# Patient Record
Sex: FEMALE | Race: WHITE | NOT HISPANIC OR LATINO | Employment: OTHER | URBAN - METROPOLITAN AREA
[De-identification: names, ages, dates, MRNs, and addresses within clinical notes are randomized per-mention and may not be internally consistent; named-entity substitution may affect disease eponyms.]

---

## 2023-06-08 LAB
ALANINE AMINOTRANSFERASE (SGPT) (U/L) IN SER/PLAS: 12 U/L (ref 7–45)
ALBUMIN (G/DL) IN SER/PLAS: 4.1 G/DL (ref 3.4–5)
ALKALINE PHOSPHATASE (U/L) IN SER/PLAS: 54 U/L (ref 33–136)
ANION GAP IN SER/PLAS: 12 MMOL/L (ref 10–20)
ASPARTATE AMINOTRANSFERASE (SGOT) (U/L) IN SER/PLAS: 17 U/L (ref 9–39)
BILIRUBIN TOTAL (MG/DL) IN SER/PLAS: 0.4 MG/DL (ref 0–1.2)
CALCIDIOL (25 OH VITAMIN D3) (NG/ML) IN SER/PLAS: 56 NG/ML
CALCIUM (MG/DL) IN SER/PLAS: 9.6 MG/DL (ref 8.6–10.3)
CARBON DIOXIDE, TOTAL (MMOL/L) IN SER/PLAS: 29 MMOL/L (ref 21–32)
CHLORIDE (MMOL/L) IN SER/PLAS: 103 MMOL/L (ref 98–107)
CHOLESTEROL (MG/DL) IN SER/PLAS: 143 MG/DL (ref 0–199)
CHOLESTEROL IN HDL (MG/DL) IN SER/PLAS: 51.9 MG/DL
CHOLESTEROL/HDL RATIO: 2.8
CREATININE (MG/DL) IN SER/PLAS: 1.39 MG/DL (ref 0.5–1.05)
ERYTHROCYTE DISTRIBUTION WIDTH (RATIO) BY AUTOMATED COUNT: 13.1 % (ref 11.5–14.5)
ERYTHROCYTE MEAN CORPUSCULAR HEMOGLOBIN CONCENTRATION (G/DL) BY AUTOMATED: 31.4 G/DL (ref 32–36)
ERYTHROCYTE MEAN CORPUSCULAR VOLUME (FL) BY AUTOMATED COUNT: 95 FL (ref 80–100)
ERYTHROCYTES (10*6/UL) IN BLOOD BY AUTOMATED COUNT: 4.19 X10E12/L (ref 4–5.2)
ESTIMATED AVERAGE GLUCOSE FOR HBA1C: 134 MG/DL
GFR FEMALE: 36 ML/MIN/1.73M2
GLUCOSE (MG/DL) IN SER/PLAS: 109 MG/DL (ref 74–99)
HEMATOCRIT (%) IN BLOOD BY AUTOMATED COUNT: 39.8 % (ref 36–46)
HEMOGLOBIN (G/DL) IN BLOOD: 12.5 G/DL (ref 12–16)
HEMOGLOBIN A1C/HEMOGLOBIN TOTAL IN BLOOD: 6.3 %
LDL: 76 MG/DL (ref 0–99)
LEUKOCYTES (10*3/UL) IN BLOOD BY AUTOMATED COUNT: 5.3 X10E9/L (ref 4.4–11.3)
PARATHYRIN INTACT (PG/ML) IN SER/PLAS: 44 PG/ML (ref 18.5–88)
PHOSPHATE (MG/DL) IN SER/PLAS: 3.8 MG/DL (ref 2.5–4.9)
PLATELETS (10*3/UL) IN BLOOD AUTOMATED COUNT: 211 X10E9/L (ref 150–450)
POTASSIUM (MMOL/L) IN SER/PLAS: 4.7 MMOL/L (ref 3.5–5.3)
PROTEIN TOTAL: 7.1 G/DL (ref 6.4–8.2)
SODIUM (MMOL/L) IN SER/PLAS: 139 MMOL/L (ref 136–145)
THYROTROPIN (MIU/L) IN SER/PLAS BY DETECTION LIMIT <= 0.05 MIU/L: 2.44 MIU/L (ref 0.44–3.98)
TRIGLYCERIDE (MG/DL) IN SER/PLAS: 77 MG/DL (ref 0–149)
UREA NITROGEN (MG/DL) IN SER/PLAS: 25 MG/DL (ref 6–23)
VLDL: 15 MG/DL (ref 0–40)

## 2023-06-10 LAB — URINE CULTURE: NORMAL

## 2023-09-22 ENCOUNTER — HOSPITAL ENCOUNTER (INPATIENT)
Dept: DATA CONVERSION | Facility: HOSPITAL | Age: 88
LOS: 5 days | Discharge: HOME | End: 2023-09-27
Attending: INTERNAL MEDICINE | Admitting: INTERNAL MEDICINE
Payer: MEDICARE

## 2023-09-22 DIAGNOSIS — K56.3 GALLSTONE ILEUS (MULTI): ICD-10-CM

## 2023-09-22 LAB
ABO GROUP (TYPE) IN BLOOD: NORMAL
ALANINE AMINOTRANSFERASE (SGPT) (U/L) IN SER/PLAS: 77 U/L (ref 7–45)
ALBUMIN (G/DL) IN SER/PLAS: 3.6 G/DL (ref 3.4–5)
ALBUMIN (G/DL) IN SER/PLAS: 3.9 G/DL (ref 3.4–5)
ALKALINE PHOSPHATASE (U/L) IN SER/PLAS: 77 U/L (ref 33–136)
ANION GAP IN SER/PLAS: 11 MMOL/L (ref 10–20)
ANION GAP IN SER/PLAS: 12 MMOL/L (ref 10–20)
ANTIBODY SCREEN: NORMAL
APPEARANCE, URINE: CLEAR
APPEARANCE, URINE: CLEAR
ASPARTATE AMINOTRANSFERASE (SGOT) (U/L) IN SER/PLAS: 160 U/L (ref 9–39)
BASOPHILS (10*3/UL) IN BLOOD BY AUTOMATED COUNT: 0.03 X10E9/L (ref 0–0.1)
BASOPHILS/100 LEUKOCYTES IN BLOOD BY AUTOMATED COUNT: 0.2 % (ref 0–2)
BILIRUBIN TOTAL (MG/DL) IN SER/PLAS: 0.7 MG/DL (ref 0–1.2)
BILIRUBIN, URINE: NEGATIVE
BILIRUBIN, URINE: NEGATIVE
BLOOD, URINE: NEGATIVE
BLOOD, URINE: NEGATIVE
CALCIUM (MG/DL) IN SER/PLAS: 8.3 MG/DL (ref 8.6–10.3)
CALCIUM (MG/DL) IN SER/PLAS: 9.1 MG/DL (ref 8.6–10.3)
CARBON DIOXIDE, TOTAL (MMOL/L) IN SER/PLAS: 25 MMOL/L (ref 21–32)
CARBON DIOXIDE, TOTAL (MMOL/L) IN SER/PLAS: 27 MMOL/L (ref 21–32)
CHLORIDE (MMOL/L) IN SER/PLAS: 101 MMOL/L (ref 98–107)
CHLORIDE (MMOL/L) IN SER/PLAS: 104 MMOL/L (ref 98–107)
COLOR, URINE: YELLOW
COLOR, URINE: YELLOW
CREATININE (MG/DL) IN SER/PLAS: 1.2 MG/DL (ref 0.5–1.05)
CREATININE (MG/DL) IN SER/PLAS: 1.26 MG/DL (ref 0.5–1.05)
EOSINOPHILS (10*3/UL) IN BLOOD BY AUTOMATED COUNT: 0.17 X10E9/L (ref 0–0.4)
EOSINOPHILS/100 LEUKOCYTES IN BLOOD BY AUTOMATED COUNT: 1.4 % (ref 0–6)
ERYTHROCYTE DISTRIBUTION WIDTH (RATIO) BY AUTOMATED COUNT: 12.2 % (ref 11.5–14.5)
ERYTHROCYTE DISTRIBUTION WIDTH (RATIO) BY AUTOMATED COUNT: 12.3 % (ref 11.5–14.5)
ERYTHROCYTE MEAN CORPUSCULAR HEMOGLOBIN CONCENTRATION (G/DL) BY AUTOMATED: 32.5 G/DL (ref 32–36)
ERYTHROCYTE MEAN CORPUSCULAR HEMOGLOBIN CONCENTRATION (G/DL) BY AUTOMATED: 33 G/DL (ref 32–36)
ERYTHROCYTE MEAN CORPUSCULAR VOLUME (FL) BY AUTOMATED COUNT: 93 FL (ref 80–100)
ERYTHROCYTE MEAN CORPUSCULAR VOLUME (FL) BY AUTOMATED COUNT: 93 FL (ref 80–100)
ERYTHROCYTES (10*6/UL) IN BLOOD BY AUTOMATED COUNT: 4.12 X10E12/L (ref 4–5.2)
ERYTHROCYTES (10*6/UL) IN BLOOD BY AUTOMATED COUNT: 4.16 X10E12/L (ref 4–5.2)
GFR FEMALE: 40 ML/MIN/1.73M2
GFR FEMALE: 43 ML/MIN/1.73M2
GLUCOSE (MG/DL) IN SER/PLAS: 169 MG/DL (ref 74–99)
GLUCOSE (MG/DL) IN SER/PLAS: 200 MG/DL (ref 74–99)
GLUCOSE, URINE: NEGATIVE MG/DL
GLUCOSE, URINE: NEGATIVE MG/DL
HEMATOCRIT (%) IN BLOOD BY AUTOMATED COUNT: 38.5 % (ref 36–46)
HEMATOCRIT (%) IN BLOOD BY AUTOMATED COUNT: 38.5 % (ref 36–46)
HEMOGLOBIN (G/DL) IN BLOOD: 12.5 G/DL (ref 12–16)
HEMOGLOBIN (G/DL) IN BLOOD: 12.7 G/DL (ref 12–16)
IMMATURE GRANULOCYTES/100 LEUKOCYTES IN BLOOD BY AUTOMATED COUNT: 0.5 % (ref 0–0.9)
INR IN PPP BY COAGULATION ASSAY: 1 (ref 0.9–1.1)
KETONES, URINE: NEGATIVE MG/DL
KETONES, URINE: NEGATIVE MG/DL
LACTATE (MMOL/L) IN SER/PLAS: 1.2 MMOL/L (ref 0.4–2)
LEUKOCYTE ESTERASE, URINE: ABNORMAL
LEUKOCYTE ESTERASE, URINE: NEGATIVE
LEUKOCYTES (10*3/UL) IN BLOOD BY AUTOMATED COUNT: 10.8 X10E9/L (ref 4.4–11.3)
LEUKOCYTES (10*3/UL) IN BLOOD BY AUTOMATED COUNT: 12.1 X10E9/L (ref 4.4–11.3)
LIPASE (U/L) IN SER/PLAS: 67 U/L (ref 9–82)
LYMPHOCYTES (10*3/UL) IN BLOOD BY AUTOMATED COUNT: 0.79 X10E9/L (ref 0.8–3)
LYMPHOCYTES/100 LEUKOCYTES IN BLOOD BY AUTOMATED COUNT: 6.5 % (ref 13–44)
MONOCYTES (10*3/UL) IN BLOOD BY AUTOMATED COUNT: 0.81 X10E9/L (ref 0.05–0.8)
MONOCYTES/100 LEUKOCYTES IN BLOOD BY AUTOMATED COUNT: 6.7 % (ref 2–10)
NEUTROPHILS (10*3/UL) IN BLOOD BY AUTOMATED COUNT: 10.26 X10E9/L (ref 1.6–5.5)
NEUTROPHILS/100 LEUKOCYTES IN BLOOD BY AUTOMATED COUNT: 84.7 % (ref 40–80)
NITRITE, URINE: NEGATIVE
NITRITE, URINE: NEGATIVE
NRBC (PER 100 WBCS) BY AUTOMATED COUNT: 0 /100 WBC (ref 0–0)
NRBC (PER 100 WBCS) BY AUTOMATED COUNT: 0 /100 WBC (ref 0–0)
PH, URINE: 6 (ref 5–8)
PH, URINE: 7 (ref 5–8)
PHOSPHATE (MG/DL) IN SER/PLAS: 2.5 MG/DL (ref 2.5–4.9)
PLATELETS (10*3/UL) IN BLOOD AUTOMATED COUNT: 232 X10E9/L (ref 150–450)
PLATELETS (10*3/UL) IN BLOOD AUTOMATED COUNT: 256 X10E9/L (ref 150–450)
POTASSIUM (MMOL/L) IN SER/PLAS: 3.9 MMOL/L (ref 3.5–5.3)
POTASSIUM (MMOL/L) IN SER/PLAS: 4.3 MMOL/L (ref 3.5–5.3)
PROTEIN TOTAL: 6.5 G/DL (ref 6.4–8.2)
PROTEIN, URINE: ABNORMAL MG/DL
PROTEIN, URINE: NEGATIVE MG/DL
PROTHROMBIN TIME (PT) IN PPP BY COAGULATION ASSAY: 11.4 SEC (ref 9.8–12.8)
RBC, URINE: ABNORMAL /HPF (ref 0–5)
RBC, URINE: ABNORMAL /HPF (ref 0–5)
RH FACTOR: NORMAL
SARS-COV-2 RESULT: NORMAL
SODIUM (MMOL/L) IN SER/PLAS: 135 MMOL/L (ref 136–145)
SODIUM (MMOL/L) IN SER/PLAS: 137 MMOL/L (ref 136–145)
SPECIFIC GRAVITY, URINE: 1.02 (ref 1–1.03)
SPECIFIC GRAVITY, URINE: 1.02 (ref 1–1.03)
SQUAMOUS EPITHELIAL CELLS, URINE: 1 /HPF
UREA NITROGEN (MG/DL) IN SER/PLAS: 27 MG/DL (ref 6–23)
UREA NITROGEN (MG/DL) IN SER/PLAS: 29 MG/DL (ref 6–23)
UROBILINOGEN, URINE: 2 MG/DL (ref 0–1.9)
UROBILINOGEN, URINE: 4 MG/DL (ref 0–1.9)
WBC, URINE: ABNORMAL /HPF (ref 0–5)
WBC, URINE: ABNORMAL /HPF (ref 0–5)

## 2023-09-23 LAB
ALANINE AMINOTRANSFERASE (SGPT) (U/L) IN SER/PLAS: 190 U/L (ref 7–45)
ALBUMIN (G/DL) IN SER/PLAS: 3 G/DL (ref 3.4–5)
ALKALINE PHOSPHATASE (U/L) IN SER/PLAS: 51 U/L (ref 33–136)
ANION GAP IN SER/PLAS: 11 MMOL/L (ref 10–20)
ANION GAP IN SER/PLAS: NORMAL
ASPARTATE AMINOTRANSFERASE (SGOT) (U/L) IN SER/PLAS: 106 U/L (ref 9–39)
BILIRUBIN TOTAL (MG/DL) IN SER/PLAS: 0.9 MG/DL (ref 0–1.2)
CALCIUM (MG/DL) IN SER/PLAS: 7.7 MG/DL (ref 8.6–10.3)
CALCIUM (MG/DL) IN SER/PLAS: NORMAL
CARBON DIOXIDE, TOTAL (MMOL/L) IN SER/PLAS: 25 MMOL/L (ref 21–32)
CARBON DIOXIDE, TOTAL (MMOL/L) IN SER/PLAS: NORMAL
CHLORIDE (MMOL/L) IN SER/PLAS: 104 MMOL/L (ref 98–107)
CHLORIDE (MMOL/L) IN SER/PLAS: NORMAL
CREATININE (MG/DL) IN SER/PLAS: 1.12 MG/DL (ref 0.5–1.05)
CREATININE (MG/DL) IN SER/PLAS: NORMAL
ERYTHROCYTE DISTRIBUTION WIDTH (RATIO) BY AUTOMATED COUNT: 12.5 % (ref 11.5–14.5)
ERYTHROCYTE DISTRIBUTION WIDTH (RATIO) BY AUTOMATED COUNT: NORMAL
ERYTHROCYTE MEAN CORPUSCULAR HEMOGLOBIN CONCENTRATION (G/DL) BY AUTOMATED: 32.7 G/DL (ref 32–36)
ERYTHROCYTE MEAN CORPUSCULAR HEMOGLOBIN CONCENTRATION (G/DL) BY AUTOMATED: NORMAL
ERYTHROCYTE MEAN CORPUSCULAR VOLUME (FL) BY AUTOMATED COUNT: 94 FL (ref 80–100)
ERYTHROCYTE MEAN CORPUSCULAR VOLUME (FL) BY AUTOMATED COUNT: NORMAL
ERYTHROCYTES (10*6/UL) IN BLOOD BY AUTOMATED COUNT: 3.72 X10E12/L (ref 4–5.2)
ERYTHROCYTES (10*6/UL) IN BLOOD BY AUTOMATED COUNT: NORMAL
GFR FEMALE: 47 ML/MIN/1.73M2
GFR FEMALE: NORMAL
GFR MALE: NORMAL
GLUCOSE (MG/DL) IN SER/PLAS: 145 MG/DL (ref 74–99)
GLUCOSE (MG/DL) IN SER/PLAS: NORMAL
HEMATOCRIT (%) IN BLOOD BY AUTOMATED COUNT: 34.9 % (ref 36–46)
HEMATOCRIT (%) IN BLOOD BY AUTOMATED COUNT: NORMAL
HEMOGLOBIN (G/DL) IN BLOOD: 11.4 G/DL (ref 12–16)
HEMOGLOBIN (G/DL) IN BLOOD: NORMAL
LEUKOCYTES (10*3/UL) IN BLOOD BY AUTOMATED COUNT: 14.2 X10E9/L (ref 4.4–11.3)
LEUKOCYTES (10*3/UL) IN BLOOD BY AUTOMATED COUNT: NORMAL
MAGNESIUM (MG/DL) IN SER/PLAS: 1.92 MG/DL (ref 1.6–2.4)
NRBC (PER 100 WBCS) BY AUTOMATED COUNT: 0 /100 WBC (ref 0–0)
NRBC (PER 100 WBCS) BY AUTOMATED COUNT: NORMAL
PLATELETS (10*3/UL) IN BLOOD AUTOMATED COUNT: 199 X10E9/L (ref 150–450)
PLATELETS (10*3/UL) IN BLOOD AUTOMATED COUNT: NORMAL
POTASSIUM (MMOL/L) IN SER/PLAS: 4.2 MMOL/L (ref 3.5–5.3)
POTASSIUM (MMOL/L) IN SER/PLAS: NORMAL
PROTEIN TOTAL: 5.2 G/DL (ref 6.4–8.2)
SODIUM (MMOL/L) IN SER/PLAS: 136 MMOL/L (ref 136–145)
SODIUM (MMOL/L) IN SER/PLAS: NORMAL
UREA NITROGEN (MG/DL) IN SER/PLAS: 21 MG/DL (ref 6–23)
UREA NITROGEN (MG/DL) IN SER/PLAS: NORMAL
URINE CULTURE: NORMAL

## 2023-09-24 LAB
ALBUMIN (G/DL) IN SER/PLAS: 3 G/DL (ref 3.4–5)
ANION GAP IN SER/PLAS: 13 MMOL/L (ref 10–20)
CALCIUM (MG/DL) IN SER/PLAS: 7.9 MG/DL (ref 8.6–10.3)
CARBON DIOXIDE, TOTAL (MMOL/L) IN SER/PLAS: 24 MMOL/L (ref 21–32)
CHLORIDE (MMOL/L) IN SER/PLAS: 105 MMOL/L (ref 98–107)
CREATININE (MG/DL) IN SER/PLAS: 1.08 MG/DL (ref 0.5–1.05)
ERYTHROCYTE DISTRIBUTION WIDTH (RATIO) BY AUTOMATED COUNT: 12.5 % (ref 11.5–14.5)
ERYTHROCYTE MEAN CORPUSCULAR HEMOGLOBIN CONCENTRATION (G/DL) BY AUTOMATED: 32 G/DL (ref 32–36)
ERYTHROCYTE MEAN CORPUSCULAR VOLUME (FL) BY AUTOMATED COUNT: 96 FL (ref 80–100)
ERYTHROCYTES (10*6/UL) IN BLOOD BY AUTOMATED COUNT: 3.51 X10E12/L (ref 4–5.2)
GFR FEMALE: 49 ML/MIN/1.73M2
GLUCOSE (MG/DL) IN SER/PLAS: 70 MG/DL (ref 74–99)
HEMATOCRIT (%) IN BLOOD BY AUTOMATED COUNT: 33.8 % (ref 36–46)
HEMOGLOBIN (G/DL) IN BLOOD: 10.8 G/DL (ref 12–16)
LEUKOCYTES (10*3/UL) IN BLOOD BY AUTOMATED COUNT: 9.1 X10E9/L (ref 4.4–11.3)
MAGNESIUM (MG/DL) IN SER/PLAS: 2 MG/DL (ref 1.6–2.4)
NRBC (PER 100 WBCS) BY AUTOMATED COUNT: 0 /100 WBC (ref 0–0)
PHOSPHATE (MG/DL) IN SER/PLAS: 2.5 MG/DL (ref 2.5–4.9)
PLATELETS (10*3/UL) IN BLOOD AUTOMATED COUNT: 176 X10E9/L (ref 150–450)
POTASSIUM (MMOL/L) IN SER/PLAS: 3.9 MMOL/L (ref 3.5–5.3)
SODIUM (MMOL/L) IN SER/PLAS: 138 MMOL/L (ref 136–145)
UREA NITROGEN (MG/DL) IN SER/PLAS: 18 MG/DL (ref 6–23)

## 2023-09-25 LAB
ALBUMIN (G/DL) IN SER/PLAS: 3.4 G/DL (ref 3.4–5)
ANION GAP IN SER/PLAS: 12 MMOL/L (ref 10–20)
CALCIUM (MG/DL) IN SER/PLAS: 8.6 MG/DL (ref 8.6–10.3)
CARBON DIOXIDE, TOTAL (MMOL/L) IN SER/PLAS: 28 MMOL/L (ref 21–32)
CHLORIDE (MMOL/L) IN SER/PLAS: 104 MMOL/L (ref 98–107)
COMPLETE PATHOLOGY REPORT: NORMAL
CONVERTED CLINICAL DIAGNOSIS-HISTORY: NORMAL
CONVERTED FINAL DIAGNOSIS: NORMAL
CONVERTED FINAL REPORT PDF LINK TO COPY AND PASTE: NORMAL
CONVERTED GROSS DESCRIPTION: NORMAL
CREATININE (MG/DL) IN SER/PLAS: 1.15 MG/DL (ref 0.5–1.05)
ERYTHROCYTE DISTRIBUTION WIDTH (RATIO) BY AUTOMATED COUNT: 12.4 % (ref 11.5–14.5)
ERYTHROCYTE MEAN CORPUSCULAR HEMOGLOBIN CONCENTRATION (G/DL) BY AUTOMATED: 32.3 G/DL (ref 32–36)
ERYTHROCYTE MEAN CORPUSCULAR VOLUME (FL) BY AUTOMATED COUNT: 95 FL (ref 80–100)
ERYTHROCYTES (10*6/UL) IN BLOOD BY AUTOMATED COUNT: 3.83 X10E12/L (ref 4–5.2)
GFR FEMALE: 45 ML/MIN/1.73M2
GLUCOSE (MG/DL) IN SER/PLAS: 87 MG/DL (ref 74–99)
HEMATOCRIT (%) IN BLOOD BY AUTOMATED COUNT: 36.2 % (ref 36–46)
HEMOGLOBIN (G/DL) IN BLOOD: 11.7 G/DL (ref 12–16)
LEUKOCYTES (10*3/UL) IN BLOOD BY AUTOMATED COUNT: 7 X10E9/L (ref 4.4–11.3)
MAGNESIUM (MG/DL) IN SER/PLAS: 2.05 MG/DL (ref 1.6–2.4)
NRBC (PER 100 WBCS) BY AUTOMATED COUNT: 0 /100 WBC (ref 0–0)
PHOSPHATE (MG/DL) IN SER/PLAS: 2.9 MG/DL (ref 2.5–4.9)
PLATELETS (10*3/UL) IN BLOOD AUTOMATED COUNT: 194 X10E9/L (ref 150–450)
POTASSIUM (MMOL/L) IN SER/PLAS: 3.7 MMOL/L (ref 3.5–5.3)
SODIUM (MMOL/L) IN SER/PLAS: 140 MMOL/L (ref 136–145)
UREA NITROGEN (MG/DL) IN SER/PLAS: 15 MG/DL (ref 6–23)

## 2023-09-26 LAB
ALBUMIN (G/DL) IN SER/PLAS: 3.4 G/DL (ref 3.4–5)
ANION GAP IN SER/PLAS: 13 MMOL/L (ref 10–20)
BLOOD CULTURE: NORMAL
BLOOD CULTURE: NORMAL
CALCIUM (MG/DL) IN SER/PLAS: 8.7 MG/DL (ref 8.6–10.3)
CARBON DIOXIDE, TOTAL (MMOL/L) IN SER/PLAS: 26 MMOL/L (ref 21–32)
CHLORIDE (MMOL/L) IN SER/PLAS: 104 MMOL/L (ref 98–107)
CREATININE (MG/DL) IN SER/PLAS: 1.11 MG/DL (ref 0.5–1.05)
ERYTHROCYTE DISTRIBUTION WIDTH (RATIO) BY AUTOMATED COUNT: 12.5 % (ref 11.5–14.5)
ERYTHROCYTE MEAN CORPUSCULAR HEMOGLOBIN CONCENTRATION (G/DL) BY AUTOMATED: 32.7 G/DL (ref 32–36)
ERYTHROCYTE MEAN CORPUSCULAR VOLUME (FL) BY AUTOMATED COUNT: 94 FL (ref 80–100)
ERYTHROCYTES (10*6/UL) IN BLOOD BY AUTOMATED COUNT: 3.78 X10E12/L (ref 4–5.2)
GFR FEMALE: 47 ML/MIN/1.73M2
GLUCOSE (MG/DL) IN SER/PLAS: 95 MG/DL (ref 74–99)
HEMATOCRIT (%) IN BLOOD BY AUTOMATED COUNT: 35.5 % (ref 36–46)
HEMOGLOBIN (G/DL) IN BLOOD: 11.6 G/DL (ref 12–16)
LEUKOCYTES (10*3/UL) IN BLOOD BY AUTOMATED COUNT: 6.3 X10E9/L (ref 4.4–11.3)
MAGNESIUM (MG/DL) IN SER/PLAS: 1.89 MG/DL (ref 1.6–2.4)
NRBC (PER 100 WBCS) BY AUTOMATED COUNT: 0 /100 WBC (ref 0–0)
PHOSPHATE (MG/DL) IN SER/PLAS: 3.2 MG/DL (ref 2.5–4.9)
PLATELETS (10*3/UL) IN BLOOD AUTOMATED COUNT: 213 X10E9/L (ref 150–450)
POTASSIUM (MMOL/L) IN SER/PLAS: 3.7 MMOL/L (ref 3.5–5.3)
SODIUM (MMOL/L) IN SER/PLAS: 139 MMOL/L (ref 136–145)
UREA NITROGEN (MG/DL) IN SER/PLAS: 14 MG/DL (ref 6–23)

## 2023-09-27 LAB
ALBUMIN (G/DL) IN SER/PLAS: 3.5 G/DL (ref 3.4–5)
ANION GAP IN SER/PLAS: 13 MMOL/L (ref 10–20)
CALCIUM (MG/DL) IN SER/PLAS: 9 MG/DL (ref 8.6–10.3)
CARBON DIOXIDE, TOTAL (MMOL/L) IN SER/PLAS: 26 MMOL/L (ref 21–32)
CHLORIDE (MMOL/L) IN SER/PLAS: 104 MMOL/L (ref 98–107)
CREATININE (MG/DL) IN SER/PLAS: 1.07 MG/DL (ref 0.5–1.05)
ERYTHROCYTE DISTRIBUTION WIDTH (RATIO) BY AUTOMATED COUNT: 12.5 % (ref 11.5–14.5)
ERYTHROCYTE MEAN CORPUSCULAR HEMOGLOBIN CONCENTRATION (G/DL) BY AUTOMATED: 31.9 G/DL (ref 32–36)
ERYTHROCYTE MEAN CORPUSCULAR VOLUME (FL) BY AUTOMATED COUNT: 94 FL (ref 80–100)
ERYTHROCYTES (10*6/UL) IN BLOOD BY AUTOMATED COUNT: 3.83 X10E12/L (ref 4–5.2)
GFR FEMALE: 49 ML/MIN/1.73M2
GLUCOSE (MG/DL) IN SER/PLAS: 121 MG/DL (ref 74–99)
HEMATOCRIT (%) IN BLOOD BY AUTOMATED COUNT: 36.1 % (ref 36–46)
HEMOGLOBIN (G/DL) IN BLOOD: 11.5 G/DL (ref 12–16)
LEUKOCYTES (10*3/UL) IN BLOOD BY AUTOMATED COUNT: 4.8 X10E9/L (ref 4.4–11.3)
MAGNESIUM (MG/DL) IN SER/PLAS: 1.93 MG/DL (ref 1.6–2.4)
NRBC (PER 100 WBCS) BY AUTOMATED COUNT: 0 /100 WBC (ref 0–0)
PHOSPHATE (MG/DL) IN SER/PLAS: 2.7 MG/DL (ref 2.5–4.9)
PLATELETS (10*3/UL) IN BLOOD AUTOMATED COUNT: 239 X10E9/L (ref 150–450)
POTASSIUM (MMOL/L) IN SER/PLAS: 3.8 MMOL/L (ref 3.5–5.3)
SODIUM (MMOL/L) IN SER/PLAS: 139 MMOL/L (ref 136–145)
UREA NITROGEN (MG/DL) IN SER/PLAS: 13 MG/DL (ref 6–23)

## 2023-09-28 ENCOUNTER — DOCUMENTATION (OUTPATIENT)
Dept: CARE COORDINATION | Facility: CLINIC | Age: 88
End: 2023-09-28
Payer: MEDICARE

## 2023-09-28 NOTE — PROGRESS NOTES
Discharge Facility: [facility]  Discharge Diagnosis:  Final Discharge Diagnoses: Gallstone ileus   Procedures: Exploratory laparotomy with enterotomy and stone extraction,  Closed in 2 layers.     Admission Date:9.22.23  Discharge Date: 9.27.23    PCP Appointment Date:not scheduled  Specialist Appointment Date: 10.10.23 Dr Lord  Hospital Encounter and Summary: Linked   See discharge assessment below for further details     Engagement  Call Start Time: 1200 (9/28/2023 11:59 AM)    Medications  Medications reviewed with patient/caregiver?: Yes (9/28/2023 11:59 AM)  Is the patient having any side effects they believe may be caused by any medication additions or changes?: No (9/28/2023 11:59 AM)  Does the patient have all medications ordered at discharge?: Yes (9/28/2023 11:59 AM)  Care Management Interventions: No intervention needed (9/28/2023 11:59 AM)  Is the patient taking all medications as directed (includes completed medication regime)?: Yes (9/28/2023 11:59 AM)  Care Management Interventions: Provided patient education (9/28/2023 11:59 AM)    Appointments  Does the patient have a primary care provider?: Yes (9/28/2023 11:59 AM)  Care Management Interventions: Educated patient on importance of making appointment (9/28/2023 11:59 AM)  Has the patient kept scheduled appointments due by today?: Yes (9/28/2023 11:59 AM)    Self Management  What is the home health agency?: n/a (9/28/2023 11:59 AM)  What Durable Medical Equipment (DME) was ordered?: n/a (9/28/2023 11:59 AM)    Patient Teaching  Does the patient have access to their discharge instructions?: Yes (9/28/2023 11:59 AM)  Care Management Interventions: Reviewed instructions with patient (9/28/2023 11:59 AM)  What is the patient's perception of their health status since discharge?: Improving (9/28/2023 11:59 AM)  Patient/Caregiver Education Comments: Spoke with patient. No med changes. Antibiotic will be completed today. She states she doesnt want to eat very  much autumn has a tiny bit of nausea. Reviewed foods she could try that are soft. States will follow up with PCP after surgeon appt. (9/28/2023 11:59 AM)

## 2023-09-30 NOTE — PROGRESS NOTES
Service: Surgery     Subjective Data:   DIOGO SANTIZO is a 89 year old Female who is Hospital Day # 5.     Patient seen and examined this morning. Denies N/V/F/C. Tolerating full liquids. Passing flatus and 2 BM since last night. Up ambulating.  Pain controlled. Urinating well.  No acute events overnight.    Objective Data:     Objective Information:      T   P  R  BP   MAP  SpO2   Value  37.2  75  16  124/65      96%  Date/Time 9/25 20:00 9/25 20:00 9/25 20:00 9/25 20:00 9/25 20:00  Range  (37.1C - 37.2C )  (75 - 97 )  (16 - 16 )  (124 - 128 )/ (65 - 66 )    (96% - 96% )  Highest temp of 37.2 C was recorded at 9/25 20:00      Pain reported at 9/25 20:30: 0 = None    Physical Exam by System     Constitutional: Well developed, awake/alert/oriented  x3, no distress, alert and cooperative   Respiratory/Thorax: Patent airways, CTAB, normal  breath sounds with good chest expansion, thorax symmetric   Cardiovascular: Regular, rate and rhythm, no murmurs,  2+ equal pulses of the extremities, normal S 1and S 2   Gastrointestinal: Nondistended, soft, appropriately  TTP, incision C/D/I w/ staples.   Genitourinary: No peralta catheter present.   Lymphatic: No significant lymphadenopathy   Psychological: Appropriate mood and behavior     Medication     Medications:          Continuous Medications       --------------------------------  No continuous medications are active       Scheduled Medications       --------------------------------    1. Acetaminophen:  650  mg  Oral  Every 6 Hours    2. Aspirin Enteric Coated:  81  mg  Oral  Daily    3. Heparin SubCutaneous:  5000  unit(s)  SubCutaneous  Every 8 Hours    4. Lidocaine 2% Topical Gel:  1  application(s)  Topical  Once    5. Lisinopril:  2.5  mg  Oral  Daily    6. Pantoprazole:  40  mg  Oral  Daily    7. Piperacillin - Tazobactam 3.375 gram/Iso-osmotic 50 mL Premix IVPB:  50  mL  IntraVenous Piggyback  Every 8 Hours    8. Simvastatin:  10  mg  Oral  At Bedtime          PRN Medications       --------------------------------    1. HYDROmorphone Injectable:  0.2  mg  IntraVenous Push  Every 2 Hours    2. Ondansetron Injectable:  4  mg  IntraVenous Push  Every 6 Hours    3. oxyCODONE Immediate Release:  5  mg  Oral  Every 4 Hours    4. oxyCODONE Immediate Release:  10  mg  Oral  Every 4 Hours    5. Sore Throat Lozenge:  1  lozenge(s)  Oral  Every 2 Hours        Recent Lab Results     Results:    CBC: 9/26/2023 06:54              \     Hgb     /                              \     11.6 L    /  WBC  ----------------  Plt               6.3       ----------------    213              /     Hct     \                              /     35.5 L    \            RBC: 3.78 L    MCV: 94           RFP: 9/26/2023 06:54  NA+        Cl-     BUN  /                         139    104    14  /  --------------------------------  Glucose                ---------------------------  95    K+     HCO3-   Creat \                         3.7    26    1.11 H \  Calcium : 8.7Anion Gap : 13          Albumin : 3.4     Phos : 3.2      Radiology Results     Results:        Impression:       Nasogastric tube tip gastric fundus.        Signed by Johnnie Olivera MD     Xray Abdomen AP View [Sep 22 2023  5:56AM]      Impression:    As mentioned in the body of the report there are multiple pulmonary  nodules in the bilateral lung bases measuring up to 6 mm in diameter.   Follow-up is recommended per Fleischner Society guidelines.        Fleischner Society 2017 Guidelines for Management of Incidentally  Detected Pulmonary Nodules in Adults:     A.  SOLID NODULES*  Nodule Typeand Size:  Single:  *Low Risk**   < 6 mm - No routine follow-up  6-8 mm - CT at 6-12 months, then consider CT at 18-24 months  > 8 mm - Consider CT at 3 months, PET/CT, or tissue sampling  *High Risk**  < 6 mm - Optional CT at 12 months  6-8 mm - CT at 6-12 months, then CT at 18-24 months  > 8 mm - Consider CT at 3 months, PET/CT, or  tissue sampling  Multiple:  *Low Risk**   < 6 mm - No routine follow-up  6-8 mm - CT at 3-6 months, then consider CT at 18-24 months  > 8 mm - CT at 3-6 months, thenconsider CT at 18-24 months  *High Risk**  < 6 mm - Optional CT at 12 months  6-8 mm - CT at 3-6 months, then at 18-24 months  > 8 mm - CT at 3-6 months, then at 18-24 months     B. SUBSOLID NODULES*  Nodule Type and Size:  Single:    *Ground glass  < 6 mm - No routine follow-up  > 6 mm - CT at 6-12 months to confirm persistence, then CT every 2  years until 5 years  *Part Solid  < 6 mm - No routine follow-up  > 6 mm - CT at 3-6 months to confirm persistence.  If unchanged and  solid component remains < 6 mm, annual CT should be performed for 5  years.  Multiple:  < 6 mm - CT at 3-6 months.  If stable, consider CT at 2 and 4 years.  > 6 mm - CT at 3-6 months.  Subsequent management based on the most  suspicious nodule(s).     Note - These recommendations do not apply to lung cancer screening,  patients with immunosuppression, or patients with known primary  cancer.  * Dimensions are average of long and short axes, rounded to the  nearest millimeter.  ** Consider all relevant risk factors.        Signed by Kapil Reed  Addendum Ends  STUDY:  CT Abdomen and Pelvis without IV Contrast; 09/22/2023 3:51 pm     INDICATION:  Lower abdominal pain.  Nausea     COMPARISON:  CT A/P 12/22/2022;  MR Kidney 07/18/2023.     ACCESSION NUMBER(S):  [id number]     ORDERING CLINICIAN:  SHIRLEY JEAN BAPTISTE DO     TECHNIQUE:  CT of the abdomen and pelvis was performed.  Contiguous axial images  were obtained at 3 mm slice thickness through the abdomen and pelvis.   Coronal and sagittal reconstructions at 3 mm slice thickness were  performed. No intravenous contrast was administered.       Automated mA/kV exposure control was utilized and patient examination  was performed in strict accordance with principles of ALARA.     FINDINGS:  Please notethat the evaluation of  vessels, lymph nodes and organs is  limited without intravenous contrast.      LOWER CHEST:  Cardiac size is normal with mild partially visualized coronary  atherosclerosis.  No pericardial effusion.  Atelectasis is seen at the  left lung base.  Multiple pulmonary nodules are seen in the bilateral  lung bases measuring up to 6 mm in diameter.  Calcified granuloma is  seen in the right lung base.  Mild calcified plaque is seen in the  descending thoracic aorta.       ABDOMEN:     LIVER:  No hepatomegaly.  Smooth surface contour.  Normal attenuation.     BILE DUCTS:  No intrahepatic or extrahepatic biliary ductal dilatation.     GALLBLADDER:  Gallbladder is thick-walled and contracted with small locules of air  seen in the lumen of the gallbladder and question of noncalcified  stones.  Left hepatic pneumobilia is noted.  Simple fluid density  cysts are seen in the liver.       STOMACH:  Small sliding-type hiatal hernia is noted.       PANCREAS:  Mild pancreatic atrophy is noted.  No masses or ductal dilatation.     SPLEEN:  No splenomegaly or focal splenic lesion.     ADRENAL GLANDS:  No thickening or nodules.     KIDNEYS AND URETERS:  Kidneys are normal in size and location.  Mild to moderate renal  cortical thinning is seen bilaterally.  No renal or ureteral calculi.     PELVIS:     BLADDER:  No abnormalities identified.     REPRODUCTIVE ORGANS:  No acute uterine or adnexal pathology is identified.     BOWEL:  There is wall thickening and inflammation of thesecond portion of the  duodenum adjacent to the gallbladder fossa.  Wall thickening and  inflammation is also seen of the terminal ileum with suggestion of a  peripherally calcified calculus measuring 2.1 cm in diameter at the  ileocecal valve which may represent gallstone impacted at the  ileocecal valve.  Appendix is not definitively identified and may  extend into a small right inguinal hernia containing fluid.   Diverticulosis is seen in the colon.        VESSELS:  Moderate calcified atheromatous plaque is seen in the abdominal aorta  with mild calcified plaque in the iliac arteries.       PERITONEUM/RETROPERITONEUM/LYMPH NODES:  Moderate amount of simple free fluid is seen in the pelvis.  No  pneumoperitoneum.     No lymphadenopathy.     ABDOMINAL WALL:  No abnormalities identified.     SOFT TISSUES:   No abnormalities identified.     BONES:  There is a mild levoconvex curvature of the lumbar spine centered at  L4.  Mild to moderate disc disease is seen at L2-3, L3-4, L4-5, and  L5-S1.  Generalized osseous demineralization is noted.  There is mild  to moderate joint space narrowing in both hips.  No acute fracture or  aggressive osseous lesion.     IMPRESSION:  Wall thickening and inflammation of the gallbladder, second portion of  the duodenum, and terminal ileum with suggestion of a peripherally  calcified stone at the ileocecal valve.  Findings suggest possibility  of developing gallstone ileus caused by erosion of the gallstone  through the gallbladder wall into the duodenum, and traveling through  the small bowel and obstructing the ileocecal valve.  No definite  bowel obstruction is appreciated at this time however developing  obstruction is suspected.        Signed by Kapil Reed     CT Abdomen and Pelvis without Contrast [Sep 22 2023  4:43AM]      Assessment and Plan:   Daily Risk Screen   ·  Does patient have an indwelling urinary catheter? n/a consulting service     Code Status   ·  Code Status Full Code     Assessment:    POD#4 s/p exploratory laparotomy, enterotomy with extraction of obstructing gallstone, primary repair of enterotomy in two layers    #Gallstone ileus  -  S/P above surgery  -  avss  -  Advance to GIS/low fiber  -  OK for DVT chemoprophylaxis; on SC heparin  -  Agree with continuing empiric zosyn antibiotic at this time; plan to stop after 7 day course  -  Encourage IS and ambulation  -  Needs PT/OT    Plan of care discussed and  patient seen with Dr. Lord.    Electronic Signatures for Addendum Section:   Johnnie Lord (MD) (Signed Addendum 26-Sep-2023 18:01)   ADDENDUM:  Patient seen and examined this AM with Ara Bonds CNP.  Agree with above.     Electronic Signatures:  Ara Bonds (APRN-CNP)  (Signed 26-Sep-2023 08:41)   Authored: Service, Subjective Data, Objective Data, Assessment  and Plan, Note Completion      Last Updated: 26-Sep-2023 18:01 by Johnnie Lord)

## 2023-09-30 NOTE — PROGRESS NOTES
Service: Surgery     Subjective Data:   DIOGO SANTIZO is a 89 year old Female who is Hospital Day # 6.     Patient seen and examined this morning. Denies N/V/F/C. Tolerating soft diet. Passing flatus and last bowel movement yesterday. Up ambulating.  Pain controlled. Urinating well.  No acute events overnight.    Objective Data:     Objective Information:      T   P  R  BP   MAP  SpO2   Value  37.1  93  16  137/77      97%  Date/Time  8:00  8:00  8:00  8:00     8:00  Range  (36.4C - 37.1C )  (63 - 93 )  (16 - 18 )  (113 - 166 )/ (74 - 80 )    (97% - 100% )  Highest temp of 37.1 C was recorded at  8:00      Pain reported at  20:00: 0 = None    ---- Intake and Output  -----  Mn/Dy/Year Time  Intake   Output  Net  Sep 27, 2023 6:00 am  0   0  0  Sep 26, 2023 10:00 pm  100   0  100  Sep 26, 2023 2:00 pm  300   0  300    The Intake and Output Totals for the last 24 hours are:      Intake   Output  Net      400   null  null    Physical Exam by System     Constitutional: Well developed, awake/alert/oriented  x3, no distress, alert and cooperative   Respiratory/Thorax: Patent airways, CTAB, normal  breath sounds with good chest expansion, thorax symmetric   Cardiovascular: Regular, rate and rhythm, no murmurs,  2+ equal pulses of the extremities, normal S 1and S 2   Gastrointestinal: Nondistended, soft, appropriately  TTP, incision C/D/I w/ staples.   Genitourinary: No peralta catheter present.   Lymphatic: No significant lymphadenopathy   Psychological: Appropriate mood and behavior     Medication     Medications:          Continuous Medications       --------------------------------  No continuous medications are active       Scheduled Medications       --------------------------------    1. Acetaminophen:  650  mg  Oral  Every 6 Hours    2. Aspirin Enteric Coated:  81  mg  Oral  Daily    3. Calcium Carbonate 112 mg - Magnesium Chloride 64 m  tablet(s)  Oral  Daily    4. Heparin  SubCutaneous:  5000  unit(s)  SubCutaneous  Every 8 Hours    5. Lidocaine 2% Topical Gel:  1  application(s)  Topical  Once    6. Lisinopril:  2.5  mg  Oral  Daily    7. Pantoprazole:  40  mg  Oral  Daily    8. Piperacillin - Tazobactam 3.375 gram/Iso-osmotic 50 mL Premix IVPB:  50  mL  IntraVenous Piggyback  Every 8 Hours    9. Simvastatin:  10  mg  Oral  At Bedtime         PRN Medications       --------------------------------    1. HYDROmorphone Injectable:  0.2  mg  IntraVenous Push  Every 2 Hours    2. Ondansetron Injectable:  4  mg  IntraVenous Push  Every 6 Hours    3. oxyCODONE Immediate Release:  5  mg  Oral  Every 4 Hours    4. oxyCODONE Immediate Release:  10  mg  Oral  Every 4 Hours    5. Sore Throat Lozenge:  1  lozenge(s)  Oral  Every 2 Hours        Recent Lab Results     Results:        I have reviewed these laboratory results:    Complete Blood Count  27-Sep-2023 07:29:00      Result Value    White Blood Cell Count  4.8    Nucleated Erythrocyte Count  0.0    Red Blood Cell Count  3.83   L   HGB  11.5   L   HCT  36.1    MCV  94    MCHC  31.9   L   PLT  239    RDW-CV  12.5      Renal Function Panel  27-Sep-2023 07:29:00      Result Value    Glucose, Serum  121   H   NA  139    K  3.8    CL  104    Bicarbonate, Serum  26    Anion Gap, Serum  13    BUN  13    CREAT  1.07   H   GFR Female  49   A   Calcium, Serum  9.0    Phosphorus, Serum  2.7    ALB  3.5      Magnesium, Serum  27-Sep-2023 07:29:00      Result Value    Magnesium, Serum  1.93        Radiology Results     Results:        Impression:       Nasogastric tube tip gastric fundus.        Signed by Johnnie Olivera MD     Xray Abdomen AP View [Sep 22 2023  5:56AM]      Impression:    As mentioned in the body of the report there are multiple pulmonary  nodules in the bilateral lung bases measuring up to 6 mm in diameter.   Follow-up is recommended per Fleischner Society guidelines.        Fleischner Society 2017 Guidelines for Management of  Incidentally  Detected Pulmonary Nodules in Adults:     A.  SOLID NODULES*  Nodule Typeand Size:  Single:  *Low Risk**   < 6 mm - No routine follow-up  6-8 mm - CT at 6-12 months, then consider CT at 18-24 months  > 8 mm - Consider CT at 3 months, PET/CT, or tissue sampling  *High Risk**  < 6 mm - Optional CT at 12 months  6-8 mm - CT at 6-12 months, then CT at 18-24 months  > 8 mm - Consider CT at 3 months, PET/CT, or tissue sampling  Multiple:  *Low Risk**   < 6 mm - No routine follow-up  6-8 mm - CT at 3-6 months, then consider CT at 18-24 months  > 8 mm - CT at 3-6 months, thenconsider CT at 18-24 months  *High Risk**  < 6 mm - Optional CT at 12 months  6-8 mm - CT at 3-6 months, then at 18-24 months  > 8 mm - CT at 3-6 months, then at 18-24 months     B. SUBSOLID NODULES*  Nodule Type and Size:  Single:    *Ground glass  < 6 mm - No routine follow-up  > 6 mm - CT at 6-12 months to confirm persistence, then CT every 2  years until 5 years  *Part Solid  < 6 mm - No routine follow-up  > 6 mm - CT at 3-6 months to confirm persistence.  If unchanged and  solid component remains < 6 mm, annual CT should be performed for 5  years.  Multiple:  < 6 mm - CT at 3-6 months.  If stable, consider CT at 2 and 4 years.  > 6 mm - CT at 3-6 months.  Subsequent management based on the most  suspicious nodule(s).     Note - These recommendations do not apply to lung cancer screening,  patients with immunosuppression, or patients with known primary  cancer.  * Dimensions are average of long and short axes, rounded to the  nearest millimeter.  ** Consider all relevant risk factors.        Signed by Kapil Reed  Addendum Ends  STUDY:  CT Abdomen and Pelvis without IV Contrast; 09/22/2023 3:51 pm     INDICATION:  Lower abdominal pain.  Nausea     COMPARISON:  CT A/P 12/22/2022;  MR Kidney 07/18/2023.     ACCESSION NUMBER(S):  [id number]     ORDERING CLINICIAN:  SHIRLEY JEAN BAPTISTE DO     TECHNIQUE:  CT of the abdomen and pelvis  was performed.  Contiguous axial images  were obtained at 3 mm slice thickness through the abdomen and pelvis.   Coronal and sagittal reconstructions at 3 mm slice thickness were  performed. No intravenous contrast was administered.       Automated mA/kV exposure control was utilized and patient examination  was performed in strict accordance with principles of ALARA.     FINDINGS:  Please notethat the evaluation of vessels, lymph nodes and organs is  limited without intravenous contrast.      LOWER CHEST:  Cardiac size is normal with mild partially visualized coronary  atherosclerosis.  No pericardial effusion.  Atelectasis is seen at the  left lung base.  Multiple pulmonary nodules are seen in the bilateral  lung bases measuring up to 6 mm in diameter.  Calcified granuloma is  seen in the right lung base.  Mild calcified plaque is seen in the  descending thoracic aorta.       ABDOMEN:     LIVER:  No hepatomegaly.  Smooth surface contour.  Normal attenuation.     BILE DUCTS:  No intrahepatic or extrahepatic biliary ductal dilatation.     GALLBLADDER:  Gallbladder is thick-walled and contracted with small locules of air  seen in the lumen of the gallbladder and question of noncalcified  stones.  Left hepatic pneumobilia is noted.  Simple fluid density  cysts are seen in the liver.       STOMACH:  Small sliding-type hiatal hernia is noted.       PANCREAS:  Mild pancreatic atrophy is noted.  No masses or ductal dilatation.     SPLEEN:  No splenomegaly or focal splenic lesion.     ADRENAL GLANDS:  No thickening or nodules.     KIDNEYS AND URETERS:  Kidneys are normal in size and location.  Mild to moderate renal  cortical thinning is seen bilaterally.  No renal or ureteral calculi.     PELVIS:     BLADDER:  No abnormalities identified.     REPRODUCTIVE ORGANS:  No acute uterine or adnexal pathology is identified.     BOWEL:  There is wall thickening and inflammation of thesecond portion of the  duodenum adjacent to the  gallbladder fossa.  Wall thickening and  inflammation is also seen of the terminal ileum with suggestion of a  peripherally calcified calculus measuring 2.1 cm in diameter at the  ileocecal valve which may represent gallstone impacted at the  ileocecal valve.  Appendix is not definitively identified and may  extend into a small right inguinal hernia containing fluid.   Diverticulosis is seen in the colon.       VESSELS:  Moderate calcified atheromatous plaque is seen in the abdominal aorta  with mild calcified plaque in the iliac arteries.       PERITONEUM/RETROPERITONEUM/LYMPH NODES:  Moderate amount of simple free fluid is seen in the pelvis.  No  pneumoperitoneum.     No lymphadenopathy.     ABDOMINAL WALL:  No abnormalities identified.     SOFT TISSUES:   No abnormalities identified.     BONES:  There is a mild levoconvex curvature of the lumbar spine centered at  L4.  Mild to moderate disc disease is seen at L2-3, L3-4, L4-5, and  L5-S1.  Generalized osseous demineralization is noted.  There is mild  to moderate joint space narrowing in both hips.  No acute fracture or  aggressive osseous lesion.     IMPRESSION:  Wall thickening and inflammation of the gallbladder, second portion of  the duodenum, and terminal ileum with suggestion of a peripherally  calcified stone at the ileocecal valve.  Findings suggest possibility  of developing gallstone ileus caused by erosion of the gallstone  through the gallbladder wall into the duodenum, and traveling through  the small bowel and obstructing the ileocecal valve.  No definite  bowel obstruction is appreciated at this time however developing  obstruction is suspected.        Signed by Kapil Reed     CT Abdomen and Pelvis without Contrast [Sep 22 2023  4:43AM]      Assessment and Plan:   Daily Risk Screen   ·  Does patient have an indwelling urinary catheter? n/a consulting service     Code Status   ·  Code Status Full Code     Assessment:    POD#5 s/p exploratory  laparotomy, enterotomy with extraction of obstructing gallstone, primary repair of enterotomy in two layers    #Gallstone ileus  -  S/P above surgery  -  avss  -  Continue GIS/low fiber  -  OK for DVT chemoprophylaxis; on SC heparin  -  Agree with continuing empiric zosyn antibiotic at this time; plan to stop after 7 day course. ( will need 2 more days upon discharge)  -  Encourage IS and ambulation  -  Needs PT/OT    Plan of care discussed and patient seen with Dr. Lord and surgery signing off.  OARRS was reviewed and no aberrant behavior noted.  Follow up in 2 weeks.       Electronic Signatures for Addendum Section:   Johnnie Lord) (Signed Addendum 27-Sep-2023 14:54)   ADDENDUM:  Patient seen and examined this AM with Ara Bonds CNP.  Agree with above.     Electronic Signatures:  Ara Bonds (APRN-CNP)  (Signed 27-Sep-2023 09:01)   Authored: Service, Subjective Data, Objective Data, Assessment  and Plan, Note Completion      Last Updated: 27-Sep-2023 14:54 by Johnnie Lord)

## 2023-09-30 NOTE — PROGRESS NOTES
Service: Surgery     Subjective Data:   DIOGO SANTIZO is a 89 year old Female who is Hospital Day # 4.    Additional Information:    Afebrile O/N.  No acute events O/N.  Pain controlled.  Denies nausea or emesis.  Passed a lot of flatus yesterday evening.  Ambulated yesterday.  Denies dysuria.    Objective Data:     Objective Information:      T   P  R  BP   MAP  SpO2   Value  36.4  78  16  138/69   89  96%  Date/Time 9/24 20:00 9/24 20:00 9/24 20:00 9/24 20:00  9/24 11:57 9/24 20:00  Range  (36.4C - 37.5C )  (72 - 90 )  (10 - 19 )  (116 - 158 )/ (56 - 70 )  (81 - 100 )  (93% - 96% )  Highest temp of 37.5 C was recorded at 9/24 8:14      Pain reported at 9/24 22:45: sleeping    ---- Intake and Output  -----  Mn/Dy/Year Time  Intake   Output  Net  Sep 25, 2023 6:00 am  50   0  50  Sep 24, 2023 10:00 pm  0   0  0  Sep 24, 2023 2:00 pm  75   0  75    The Intake and Output Totals for the last 24 hours are:      Intake   Output  Net      125   null  null         Weights   9/25 6:00: Weight in kg (Weight (kg))  54.7  9/25 6:00: Weight in lbs ((lbs))  120.5    Physical Exam by System:    Constitutional: Well developed, awake/alert/oriented  x3, no distress, alert and cooperative   Respiratory/Thorax: No respiratory distress.   Gastrointestinal: Soft, mildly distended, appropriately  TTP, incision C/D/I w/ staples.   Genitourinary: No peralta catheter present.   Psychological: Appropriate mood and behavior     Medication:    Medications:          Continuous Medications       --------------------------------  No continuous medications are active       Scheduled Medications       --------------------------------    1. Acetaminophen:  650  mg  Oral  Every 6 Hours    2. Aspirin Enteric Coated:  81  mg  Oral  Daily    3. Bisacodyl Rectal:  10  mg  Rectal  Once    4. Heparin SubCutaneous:  5000  unit(s)  SubCutaneous  Every 8 Hours    5. Lidocaine 2% Topical Gel:  1  application(s)  Topical  Once    6. Lisinopril:  2.5   mg  Oral  Daily    7. Pantoprazole Injectable:  40  mg  IntraVenous Push  Every 24 Hours    8. Piperacillin - Tazobactam 3.375 gram/Iso-osmotic 50 mL Premix IVPB:  50  mL  IntraVenous Piggyback  Every 8 Hours    9. Simvastatin:  10  mg  Oral  At Bedtime         PRN Medications       --------------------------------    1. HYDROmorphone Injectable:  0.2  mg  IntraVenous Push  Every 2 Hours    2. Ondansetron Injectable:  4  mg  IntraVenous Push  Every 6 Hours    3. oxyCODONE Immediate Release:  5  mg  Oral  Every 4 Hours    4. oxyCODONE Immediate Release:  10  mg  Oral  Every 4 Hours    5. Sore Throat Lozenge:  1  lozenge(s)  Oral  Every 2 Hours        Recent Lab Results:    Results:    CBC: 9/24/2023 05:54              \     Hgb     /                              \     10.8 L    /  WBC  ----------------  Plt               9.1       ----------------    176              /     Hct     \                              /     33.8 L    \            RBC: 3.51 L    MCV: 96         Assessment and Plan:   Daily Risk Screen:  ·  Does patient have an indwelling urinary catheter? yes   ·  Plan for indwelling urinary catheter removal today? yes     Code Status:  ·  Code Status Full Code     Assessment:    POD#3 s/p exploratory laparotomy, enterotomy with extraction of obstructing gallstone, primary repair of enterotomy in two layers    #Gallstone ileus  -  S/P above surgery  -  Advance to full liquid diet  -  OK for DVT chemoprophylaxis; on SC heparin  -  Agree with continuing empiric zosyn antibiotic at this time; plan to stop after 7 day course  -  Encourage IS and ambulation  -  Needs PT/OT  -  Dulcolax suppository x1 today            Electronic Signatures:  Johnnie Lord)  (Signed 25-Sep-2023 07:02)   Authored: Service, Subjective Data, Objective Data, Assessment  and Plan, Note Completion      Last Updated: 25-Sep-2023 07:02 by Johnnie Lord)

## 2023-09-30 NOTE — PROGRESS NOTES
Service: Medicine     Subjective Data:   DIOGO SANTIZO is a 89 year old Female who is Hospital Day # 3.    Additional Information:    Patient seen examined at bedside's morning.  No acute complaints overnight.  Patient notes that her abdominal tenderness is improved from yesterday.  Advancing diet  to clear liquids today.  Denies any nausea, vomiting.  Notes that she did pass slight amount of gas this morning.    Objective Data:     Objective Information:      T   P  R  BP   MAP  SpO2   Value  37.5  79  18  158/70   100  93%  Date/Time 9/24 8:14 9/24 8:14 9/24 8:14 9/24 8:14  9/24 8:14 9/24 8:14  Range  (36.2C - 37.5C )  (78 - 104 )  (10 - 29 )  (129 - 158 )/ (61 - 70 )  (88 - 100 )  (92% - 95% )   As of 23-Sep-2023 04:00:00, patient is on 2 L/min of oxygen via room air.  Highest temp of 37.5 C was recorded at 9/23 20:00      Pain reported at 9/24 5:00: 0 = None    ---- Intake and Output  -----  Mn/Dy/Year Time  Intake   Output  Net  Sep 24, 2023 6:00 am  755   350  405  Sep 23, 2023 10:00 pm  380   250  130  Sep 23, 2023 2:00 pm  150   300  -150    The Intake and Output Totals for the last 24 hours are:      Intake   Output  Net      1285   900  385    Physical Exam Narrative:  ·  Physical Exam:    Constitutional: Well developed, no distress, alert and cooperative.  Eyes: EOMI, clear sclera  Head/Neck: Normocephalic, atraumatic without lesions  Respiratory/Thorax: Airways CTA bilaterally, no accessory muscle use  Cardiovascular: Regular rhythm, no murmurs, 2+ equal pulses of the extremities, normal S1 and S2  Gastrointestinal: Nondistended, soft, mild diffuse tenderness to palpation, no rebound tenderness or guarding, +BS in all four quadrants  Musculoskeletal: Normal and Equal strength  Extremities: No edema  Neurological: alert and oriented x3, intact senses, motor  Psychological: Appropriate mood and behavior  Skin: Abdominal surgical site intact without any surrounding  erythema.    Medication:    Medications:          Continuous Medications       --------------------------------  No continuous medications are active       Scheduled Medications       --------------------------------    1. Acetaminophen:  650  mg  Oral  Every 6 Hours    2. Heparin SubCutaneous:  5000  unit(s)  SubCutaneous  Every 8 Hours    3. Lidocaine 2% Topical Gel:  1  application(s)  Topical  Once    4. Pantoprazole Injectable:  40  mg  IntraVenous Push  Every 24 Hours    5. Piperacillin - Tazobactam 3.375 gram/Iso-osmotic 50 mL Premix IVPB:  50  mL  IntraVenous Piggyback  Every 8 Hours         PRN Medications       --------------------------------    1. HYDROmorphone Injectable:  0.2  mg  IntraVenous Push  Every 2 Hours    2. Ondansetron Injectable:  4  mg  IntraVenous Push  Every 6 Hours    3. oxyCODONE Immediate Release:  5  mg  Oral  Every 4 Hours    4. oxyCODONE Immediate Release:  10  mg  Oral  Every 4 Hours    5. Sore Throat Lozenge:  1  lozenge(s)  Oral  Every 2 Hours        Recent Lab Results:    Results:        I have reviewed these laboratory results:    Complete Blood Count  Trending View      Result 24-Sep-2023 05:54:00  23-Sep-2023 05:27:00    White Blood Cell Count 9.1   14.2   H    Nucleated Erythrocyte Count 0.0   0.0    Red Blood Cell Count 3.51   L   3.72   L    HGB 10.8   L   11.4   L    HCT 33.8   L   34.9   L    MCV 96   94    MCHC 32.0   32.7       199    RDW-CV 12.5   12.5        Renal Function Panel  24-Sep-2023 05:54:00      Result Value    Glucose, Serum  70   L   NA  138    K  3.9    CL  105    Bicarbonate, Serum  24    Anion Gap, Serum  13    BUN  18    CREAT  1.08   H   GFR Female  49   A   Calcium, Serum  7.9   L   Phosphorus, Serum  2.5    ALB  3.0   L     Magnesium, Serum  24-Sep-2023 05:54:00      Result Value    Magnesium, Serum  2.00      Comprehensive Metabolic Panel  23-Sep-2023 05:27:00      Result Value    Glucose, Serum  145   H   NA  136    K  4.2    CL  104     Bicarbonate, Serum  25    Anion Gap, Serum  11    BUN  21    CREAT  1.12   H   GFR Female  47   A   Calcium, Serum  7.7   L   ALB  3.0   L   ALKP  51    T Pro  5.2   L   T Bili  0.9    Alanine Aminotransferase, Serum  190   H   Aspartate Transaminase, Serum  106   H       Radiology Results:    Results:        Impression:       Nasogastric tube tip gastric fundus.        Signed by Johnnie Olivera MD     Xray Abdomen AP View [Sep 22 2023  5:56AM]      Impression:    As mentioned in the body of the report there are multiple pulmonary  nodules in the bilateral lung bases measuring up to 6 mm in diameter.   Follow-up is recommended per Fleischner Society guidelines.        Fleischner Society 2017 Guidelines for Management of Incidentally  Detected Pulmonary Nodules in Adults:     A.  SOLID NODULES*  Nodule Typeand Size:  Single:  *Low Risk**   < 6 mm - No routine follow-up  6-8 mm - CT at 6-12 months, then consider CT at 18-24 months  > 8 mm - Consider CT at 3 months, PET/CT, or tissue sampling  *High Risk**  < 6 mm - Optional CT at 12 months  6-8 mm - CT at 6-12 months, then CT at 18-24 months  > 8 mm - Consider CT at 3 months, PET/CT, or tissue sampling  Multiple:  *Low Risk**   < 6 mm - No routine follow-up  6-8 mm - CT at 3-6 months, then consider CT at 18-24 months  > 8 mm - CT at 3-6 months, thenconsider CT at 18-24 months  *High Risk**  < 6 mm - Optional CT at 12 months  6-8 mm - CT at 3-6 months, then at 18-24 months  > 8 mm - CT at 3-6 months, then at 18-24 months     B. SUBSOLID NODULES*  Nodule Type and Size:  Single:    *Ground glass  < 6 mm - No routine follow-up  > 6 mm - CT at 6-12 months to confirm persistence, then CT every 2  years until 5 years  *Part Solid  < 6 mm - No routine follow-up  > 6 mm - CT at 3-6 months to confirm persistence.  If unchanged and  solid component remains < 6 mm, annual CT should be performed for 5  years.  Multiple:  < 6 mm - CT at 3-6 months.  If stable, consider CT at 2  and 4 years.  > 6 mm - CT at 3-6 months.  Subsequent management based on the most  suspicious nodule(s).     Note - These recommendations do not apply to lung cancer screening,  patients with immunosuppression, or patients with known primary  cancer.  * Dimensions are average of long and short axes, rounded to the  nearest millimeter.  ** Consider all relevant risk factors.        Signed by Kapil Reed  Addendum Ends  STUDY:  CT Abdomen and Pelvis without IV Contrast; 09/22/2023 3:51 pm     INDICATION:  Lower abdominal pain.  Nausea     COMPARISON:  CT A/P 12/22/2022;  MR Kidney 07/18/2023.     ACCESSION NUMBER(S):  [id number]     ORDERING CLINICIAN:  SHIRLEY JEAN BAPTISTE DO     TECHNIQUE:  CT of the abdomen and pelvis was performed.  Contiguous axial images  were obtained at 3 mm slice thickness through the abdomen and pelvis.   Coronal and sagittal reconstructions at 3 mm slice thickness were  performed. No intravenous contrast was administered.       Automated mA/kV exposure control was utilized and patient examination  was performed in strict accordance with principles of ALARA.     FINDINGS:  Please notethat the evaluation of vessels, lymph nodes and organs is  limited without intravenous contrast.      LOWER CHEST:  Cardiac size is normal with mild partially visualized coronary  atherosclerosis.  No pericardial effusion.  Atelectasis is seen at the  left lung base.  Multiple pulmonary nodules are seen in the bilateral  lung bases measuring up to 6 mm in diameter.  Calcified granuloma is  seen in the right lung base.  Mild calcified plaque is seen in the  descending thoracic aorta.       ABDOMEN:     LIVER:  No hepatomegaly.  Smooth surface contour.  Normal attenuation.     BILE DUCTS:  No intrahepatic or extrahepatic biliary ductal dilatation.     GALLBLADDER:  Gallbladder is thick-walled and contracted with small locules of air  seen in the lumen of the gallbladder and question of noncalcified  stones.   Left hepatic pneumobilia is noted.  Simple fluid density  cysts are seen in the liver.       STOMACH:  Small sliding-type hiatal hernia is noted.       PANCREAS:  Mild pancreatic atrophy is noted.  No masses or ductal dilatation.     SPLEEN:  No splenomegaly or focal splenic lesion.     ADRENAL GLANDS:  No thickening or nodules.     KIDNEYS AND URETERS:  Kidneys are normal in size and location.  Mild to moderate renal  cortical thinning is seen bilaterally.  No renal or ureteral calculi.     PELVIS:     BLADDER:  No abnormalities identified.     REPRODUCTIVE ORGANS:  No acute uterine or adnexal pathology is identified.     BOWEL:  There is wall thickening and inflammation of thesecond portion of the  duodenum adjacent to the gallbladder fossa.  Wall thickening and  inflammation is also seen of the terminal ileum with suggestion of a  peripherally calcified calculus measuring 2.1 cm in diameter at the  ileocecal valve which may represent gallstone impacted at the  ileocecal valve.  Appendix is not definitively identified and may  extend into a small right inguinal hernia containing fluid.   Diverticulosis is seen in the colon.       VESSELS:  Moderate calcified atheromatous plaque is seen in the abdominal aorta  with mild calcified plaque in the iliac arteries.       PERITONEUM/RETROPERITONEUM/LYMPH NODES:  Moderate amount of simple free fluid is seen in the pelvis.  No  pneumoperitoneum.     No lymphadenopathy.     ABDOMINAL WALL:  No abnormalities identified.     SOFT TISSUES:   No abnormalities identified.     BONES:  There is a mild levoconvex curvature of the lumbar spine centered at  L4.  Mild to moderate disc disease is seen at L2-3, L3-4, L4-5, and  L5-S1.  Generalized osseous demineralization is noted.  There is mild  to moderate joint space narrowing in both hips.  No acute fracture or  aggressive osseous lesion.     IMPRESSION:  Wall thickening and inflammation of the gallbladder, second portion of  the  duodenum, and terminal ileum with suggestion of a peripherally  calcified stone at the ileocecal valve.  Findings suggest possibility  of developing gallstone ileus caused by erosion of the gallstone  through the gallbladder wall into the duodenum, and traveling through  the small bowel and obstructing the ileocecal valve.  No definite  bowel obstruction is appreciated at this time however developing  obstruction is suspected.        Signed by Kapil Reed     CT Abdomen and Pelvis without Contrast [Sep 22 2023  4:43AM]      Impression:    Numerous scattered bilateral pulmonary nodules measuring 5 mm or less  in size, not significantly changed when compared to the previous  examination. No new pulmonary nodules.     CT Chest without Contrast [Jul 27 2023  6:51PM]      Impression:    1.  7 mm right interpolar region exophytic proteinaceous or  hemorrhagic cyst. 9 mm left simple cyst. No suspicious renal masses  or enhancement bilaterally.  2. Multiple bilobar simple hepatic cysts, the largest measures 4.5 x  3.2 cm.  3. Mild intrahepatic and common bile duct dilation. Cholelithiasis  without cholecystitis.  4. Extensive colonic diverticulosis without evidence of  diverticulitis.           MRI Kidney w/wo Contrast [Jul 19 2023 12:17PM]      Impression:    1 cm cyst of the right kidney which appears partially complex.  Consider further evaluation with renal mass protocol MRI for further  assessment.     Ultrasound Renal Bilateral [Jun 28 2023 11:45PM]      Assessment and Plan:   Daily Risk Screen:  ·  Does patient have an indwelling urinary catheter? yes   ·  Plan for indwelling urinary catheter removal today? yes     Code Status:  ·  Code Status Full Code     Assessment:    89yo F with PMHX CKD Stage III, HTN, HLD, CAD s/p stent, GERD, gallstones presenting to ER with abdominal pain. CT abdomen and pelvis done given pts symptoms, revealed:  wall thickening and inflammation of the gallbladder, second portion of the  duodenum, and terminal ileum with suggestion of a peripherally calcified stone at the ileocecal valve. Likely gallstone ileus caused by erosion of the gallstone through the gallbladder  wall into the duodenum, and traveling through the small bowel and obstructing the ileocecal valve. Moderate stool burden also noted.  Patient was SIRS positive and septic likely due to gallstone erosion and ileus, started on IV zosyn.  Patient underwent  surgery with Dr. Lord on 9/22 for exploratory laparotomy and enterotomy for removal of gallstone followed by primary closure in 2 layers.  Tolerated procedure well, advancing diet per Dr. Lord's recommendations.     #Gallstone ileus with severe stool burden, s/p exploratory laparotomy with enterotomy for removal of gallstone (9/22)  #Sepsis 2/2 above-improving  -Recovering well from procedure, able to pass some gas this morning  -Advancing diet to clear liquids per Dr. Lord today  -Continue IV Zosyn  -Blood cultures negative for last 48 hours  -Oxycodone as needed for pain, Zofran as needed for nausea  -We will continue to assess clinical status, anticipate discharge in the next 1-2 days pending diet advancement, surgery recommendations, PT/OT evaluation    #CKD Stage III  - renally dose medications  - mIVF for fluid resuscitation   - continue to trend labs    #HTN  #HLD  #GERD  -Continue home lisinopril and omeprazole     Consults: Surgery  Diet: Clear liquid diet  DVTppx: Heparin    Disposition: Patient clinically improving, anticipate 1-2 days in the hospital pending diet advancement and surgery recommendations.     FULL CODE    Discussed with attending,    Ashly Beth DO  Internal Medicine, PGY-3     Attestation:   Note Completion:  I am a:  Resident/Fellow   Attending Attestation I saw and evaluated the patient.  I personally obtained the key and critical portions of the history and physical exam or was physically present for key and  critical portions performed by the  resident/fellow. I reviewed the resident/fellow?s documentation and discussed the patient with the resident/fellow.  I agree with the resident/fellow?s medical decision making as documented in the note.     I personally evaluated the patient on 24-Sep-2023   Comments/ Additional Findings    Patient seen and examined, no acute events overnight. Sue is doing well today.  She is up ambulating. She is using her incentive spirometry.  She is tolerating a clear liquid diet.  She has minimal abdominal pain. She is passing gas.  Her labs and vitals are stable.  Plan to transfer her to the general medical floor.  We will defer her diet to general surgery. Discussed plan of care with patient,  all questions answered, will continue supportive care and follow patient closely.          Electronic Signatures:  Ashly Beth ( (Resident))  (Signed 24-Sep-2023 10:34)   Authored: Service, Subjective Data, Objective Data, Assessment  and Plan, Note Completion  Chio Gregory (DO)  (Signed 24-Sep-2023 14:18)   Authored: Note Completion   Co-Signer: Service, Subjective Data, Objective Data, Assessment and Plan, Note Completion      Last Updated: 24-Sep-2023 14:18 by Chio Gregory ()

## 2023-09-30 NOTE — PROGRESS NOTES
Service: Medicine     Subjective Data:   DIOGO SANTIZO is a 89 year old Female who is Hospital Day # 5.    Additional Information:    -Patient seen this morning resting comfortably in the chair  -Patient had no new complaints this morning denied any pain  -Patient's VSS WNL overnight  -Patient's labs significant for hemoglobin 11.6, creatinine 1.11  -Patient's diet advanced to GI soft today as per surgery's recommendation  -Patient tolerating diet well  -Patient had 2 BMs overnight and is still passing flatus denies any blood in the stool, or dark stools    Objective Data:     Objective Information:      T   P  R  BP   MAP  SpO2   Value  36.5  85  18  166/80      98%  Date/Time 9/26 8:00 9/26 8:00 9/26 8:00 9/26 8:00    9/26 8:00  Range  (36.5C - 37.2C )  (75 - 97 )  (16 - 18 )  (124 - 166 )/ (65 - 80 )    (96% - 98% )  Highest temp of 37.2 C was recorded at 9/25 20:00      Pain reported at 9/25 20:30: 0 = None    Physical Exam Narrative:  ·  Physical Exam:    General: Not in acute distress, A&O x 3, alert, cooperative, well-developed  HEENT: Normocephalic, atraumatic, EOMI, moist mucous membranes  Neck: Neck supple, trachea midline, no evidence of trauma  Cardiovascular: RRR, S1 and S2 appreciated, no murmurs rubs gallops appreciated, distal pulses 2+ bilaterally (radial and dorsalis pedis)  Respiratory: Vesicular breath sounds appreciated bilaterally, no adventitious sounds appreciated, no increased work of breathing  GI: Ex-Lap Incision with staples open to air, without erythema or drainage, Abdomen soft, nondistended, nontender to palpation, bowel sounds present  Extremities: No edema appreciated in lower extremities bilaterally, no cyanosis  Neuro: A&O X3, no focal deficits, strength and sensation intact bilaterally  Skin: Warm and dry, without lesions or rashes      Medication:    Medications:          Continuous Medications       --------------------------------  No continuous medications are  active       Scheduled Medications       --------------------------------    1. Acetaminophen:  650  mg  Oral  Every 6 Hours    2. Aspirin Enteric Coated:  81  mg  Oral  Daily    3. Calcium Carbonate 112 mg - Magnesium Chloride 64 m  tablet(s)  Oral  Daily    4. Heparin SubCutaneous:  5000  unit(s)  SubCutaneous  Every 8 Hours    5. Lidocaine 2% Topical Gel:  1  application(s)  Topical  Once    6. Lisinopril:  2.5  mg  Oral  Daily    7. Pantoprazole:  40  mg  Oral  Daily    8. Piperacillin - Tazobactam 3.375 gram/Iso-osmotic 50 mL Premix IVPB:  50  mL  IntraVenous Piggyback  Every 8 Hours    9. Simvastatin:  10  mg  Oral  At Bedtime         PRN Medications       --------------------------------    1. HYDROmorphone Injectable:  0.2  mg  IntraVenous Push  Every 2 Hours    2. Ondansetron Injectable:  4  mg  IntraVenous Push  Every 6 Hours    3. oxyCODONE Immediate Release:  5  mg  Oral  Every 4 Hours    4. oxyCODONE Immediate Release:  10  mg  Oral  Every 4 Hours    5. Sore Throat Lozenge:  1  lozenge(s)  Oral  Every 2 Hours        Recent Lab Results:    Results:    CBC: 2023 06:54              \     Hgb     /                              \     11.6 L    /  WBC  ----------------  Plt               6.3       ----------------    213              /     Hct     \                              /     35.5 L    \            RBC: 3.78 L    MCV: 94           RFP: 2023 06:54  NA+        Cl-     BUN  /                         139    104    14  /  --------------------------------  Glucose                ---------------------------  95    K+     HCO3-   Creat \                         3.7    26    1.11 H \  Calcium : 8.7Anion Gap : 13          Albumin : 3.4     Phos : 3.2        I have reviewed these laboratory results:    Complete Blood Count  26-Sep-2023 06:54:00      Result Value    White Blood Cell Count  6.3    Nucleated Erythrocyte Count  0.0    Red Blood Cell Count  3.78   L   HGB  11.6   L   HCT  35.5    L   MCV  94    MCHC  32.7    PLT  213    RDW-CV  12.5      Renal Function Panel  26-Sep-2023 06:54:00      Result Value    Glucose, Serum  95    NA  139    K  3.7    CL  104    Bicarbonate, Serum  26    Anion Gap, Serum  13    BUN  14    CREAT  1.11   H   GFR Female  47   A   Calcium, Serum  8.7    Phosphorus, Serum  3.2    ALB  3.4      Magnesium, Serum  26-Sep-2023 06:54:00      Result Value    Magnesium, Serum  1.89        Radiology Results:    Results:        Impression:       Nasogastric tube tip gastric fundus.        Signed by Johnnie Olivera MD     Xray Abdomen AP View [Sep 22 2023  5:56AM]      Impression:    As mentioned in the body of the report there are multiple pulmonary  nodules in the bilateral lung bases measuring up to 6 mm in diameter.   Follow-up is recommended per Fleischner Society guidelines.        Fleischner Society 2017 Guidelines for Management of Incidentally  Detected Pulmonary Nodules in Adults:     A.  SOLID NODULES*  Nodule Typeand Size:  Single:  *Low Risk**   < 6 mm - No routine follow-up  6-8 mm - CT at 6-12 months, then consider CT at 18-24 months  > 8 mm - Consider CT at 3 months, PET/CT, or tissue sampling  *High Risk**  < 6 mm - Optional CT at 12 months  6-8 mm - CT at 6-12 months, then CT at 18-24 months  > 8 mm - Consider CT at 3 months, PET/CT, or tissue sampling  Multiple:  *Low Risk**   < 6 mm - No routine follow-up  6-8 mm - CT at 3-6 months, then consider CT at 18-24 months  > 8 mm - CT at 3-6 months, thenconsider CT at 18-24 months  *High Risk**  < 6 mm - Optional CT at 12 months  6-8 mm - CT at 3-6 months, then at 18-24 months  > 8 mm - CT at 3-6 months, then at 18-24 months     B. SUBSOLID NODULES*  Nodule Type and Size:  Single:    *Ground glass  < 6 mm - No routine follow-up  > 6 mm - CT at 6-12 months to confirm persistence, then CT every 2  years until 5 years  *Part Solid  < 6 mm - No routine follow-up  > 6 mm - CT at 3-6 months to confirm persistence.  If  unchanged and  solid component remains < 6 mm, annual CT should be performed for 5  years.  Multiple:  < 6 mm - CT at 3-6 months.  If stable, consider CT at 2 and 4 years.  > 6 mm - CT at 3-6 months.  Subsequent management based on the most  suspicious nodule(s).     Note - These recommendations do not apply to lung cancer screening,  patients with immunosuppression, or patients with known primary  cancer.  * Dimensions are average of long and short axes, rounded to the  nearest millimeter.  ** Consider all relevant risk factors.        Signed by Kapil Reed  Addendum Ends  STUDY:  CT Abdomen and Pelvis without IV Contrast; 09/22/2023 3:51 pm     INDICATION:  Lower abdominal pain.  Nausea     COMPARISON:  CT A/P 12/22/2022;  MR Kidney 07/18/2023.     ACCESSION NUMBER(S):  [id number]     ORDERING CLINICIAN:  SHIRLEY JEAN BAPTISTE DO     TECHNIQUE:  CT of the abdomen and pelvis was performed.  Contiguous axial images  were obtained at 3 mm slice thickness through the abdomen and pelvis.   Coronal and sagittal reconstructions at 3 mm slice thickness were  performed. No intravenous contrast was administered.       Automated mA/kV exposure control was utilized and patient examination  was performed in strict accordance with principles of ALARA.     FINDINGS:  Please notethat the evaluation of vessels, lymph nodes and organs is  limited without intravenous contrast.      LOWER CHEST:  Cardiac size is normal with mild partially visualized coronary  atherosclerosis.  No pericardial effusion.  Atelectasis is seen at the  left lung base.  Multiple pulmonary nodules are seen in the bilateral  lung bases measuring up to 6 mm in diameter.  Calcified granuloma is  seen in the right lung base.  Mild calcified plaque is seen in the  descending thoracic aorta.       ABDOMEN:     LIVER:  No hepatomegaly.  Smooth surface contour.  Normal attenuation.     BILE DUCTS:  No intrahepatic or extrahepatic biliary ductal dilatation.      GALLBLADDER:  Gallbladder is thick-walled and contracted with small locules of air  seen in the lumen of the gallbladder and question of noncalcified  stones.  Left hepatic pneumobilia is noted.  Simple fluid density  cysts are seen in the liver.       STOMACH:  Small sliding-type hiatal hernia is noted.       PANCREAS:  Mild pancreatic atrophy is noted.  No masses or ductal dilatation.     SPLEEN:  No splenomegaly or focal splenic lesion.     ADRENAL GLANDS:  No thickening or nodules.     KIDNEYS AND URETERS:  Kidneys are normal in size and location.  Mild to moderate renal  cortical thinning is seen bilaterally.  No renal or ureteral calculi.     PELVIS:     BLADDER:  No abnormalities identified.     REPRODUCTIVE ORGANS:  No acute uterine or adnexal pathology is identified.     BOWEL:  There is wall thickening and inflammation of thesecond portion of the  duodenum adjacent to the gallbladder fossa.  Wall thickening and  inflammation is also seen of the terminal ileum with suggestion of a  peripherally calcified calculus measuring 2.1 cm in diameter at the  ileocecal valve which may represent gallstone impacted at the  ileocecal valve.  Appendix is not definitively identified and may  extend into a small right inguinal hernia containing fluid.   Diverticulosis is seen in the colon.       VESSELS:  Moderate calcified atheromatous plaque is seen in the abdominal aorta  with mild calcified plaque in the iliac arteries.       PERITONEUM/RETROPERITONEUM/LYMPH NODES:  Moderate amount of simple free fluid is seen in the pelvis.  No  pneumoperitoneum.     No lymphadenopathy.     ABDOMINAL WALL:  No abnormalities identified.     SOFT TISSUES:   No abnormalities identified.     BONES:  There is a mild levoconvex curvature of the lumbar spine centered at  L4.  Mild to moderate disc disease is seen at L2-3, L3-4, L4-5, and  L5-S1.  Generalized osseous demineralization is noted.  There is mild  to moderate joint space  narrowing in both hips.  No acute fracture or  aggressive osseous lesion.     IMPRESSION:  Wall thickening and inflammation of the gallbladder, second portion of  the duodenum, and terminal ileum with suggestion of a peripherally  calcified stone at the ileocecal valve.  Findings suggest possibility  of developing gallstone ileus caused by erosion of the gallstone  through the gallbladder wall into the duodenum, and traveling through  the small bowel and obstructing the ileocecal valve.  No definite  bowel obstruction is appreciated at this time however developing  obstruction is suspected.        Signed by Kapil Reed     CT Abdomen and Pelvis without Contrast [Sep 22 2023  4:43AM]      Assessment and Plan:   Code Status:  ·  Code Status Full Code     Assessment:    Patient is a 89-year-old female with past medical history significant for CKD 3, HTN, DLD, GERD, gallstones, SARS-CoV-2 (8/23), NIDDM 2.  Patient presented to the  ED on 9/22/2023 complaining of abdominal pain for approximately 12 hours.  In the ED patient was found to have gallstone ileus which was suggested via the CT scan.  Patient was admitted for gallstone ileus  and is now s/p ex lap with enterotomy and removal  of gallstone with primary closure in 2 layers on 9/22/2023.    1.  Sepsis 2/2 Gallstone ileus with severe stool burden: patient presented with abdominal pain and was found to have suggested gallstone ileus  via CT abdomen pelvis with contrast.  -s/p exploratory laparotomy with enterotomy for gallstone removal (9/22/23)  -as per surgery advance diet to GI soft diet from full liquid diet  -Continue Zosyn 3.375 g IV every 8 hours  -Continue  Zofran 4 mg every 6 hours as needed for nausea  -Blood cultures continue to be negative  -Surgery is following continue to appreciate recommendations    2.  CKD stage III: Known Hx   -creatinine today 1.11  -Renally dose all medications  -Avoid nephrotoxic agents  -Continue to monitor renal  function    3. HTN: Known Hx  -Continue lisinopril 2.5 mg p.o. daily  4. HLD: Known Hx  -Continue simvastatin 10 mg p.o. nightly  5.GERD: Known Hx  -Continue pantoprazole 40 mg p.o. daily    IVF: None at this time  Diet:  GI soft diet  DVT prophylaxis: Heparin subcu 5000 units 3 times daily  Dispo: anticipate discharge in the next 1-2 days pending surgery clearance    CODE STATUS: Full code    Assessment and plan discussed with senior resident and attending.    Julio Mayfield M.D.  Internal Medicine PGY-1  Available on [employer]      Attestation:   Note Completion:  I am a:  Resident/Fellow   Attending Attestation I saw and evaluated the patient.  I personally obtained the key and critical portions of the history and physical exam or was physically present for key and  critical portions performed by the resident/fellow. I reviewed the resident/fellow?s documentation and discussed the patient with the resident/fellow.  I agree with the resident/fellow?s medical decision making as documented in the note.     I personally evaluated the patient on 26-Sep-2023   Comments/ Additional Findings    patient seen and examined, no acute events over night  misty is doing well, hopes to go home tomorrow  diet advanced to GI soft  + small BM  labs/vitals stable  hopefully DC home in AM pending continued improvement and clearance from surgery team          Electronic Signatures:  Chio Gregory ()  (Signed 26-Sep-2023 15:29)   Authored: Note Completion   Co-Signer: Service, Subjective Data, Objective Data, Assessment and Plan, Note Completion  Julio Mayfield (Resident))  (Signed 26-Sep-2023 11:11)   Authored: Service, Subjective Data, Objective Data, Assessment  and Plan, Note Completion      Last Updated: 26-Sep-2023 15:29 by Chio Gregory ()

## 2023-09-30 NOTE — PROGRESS NOTES
Service: Medicine     Subjective Data:   DIOGO SANTIZO is a 89 year old Female who is Hospital Day # 2.    Additional Information:    Patient seen examined at bedside's morning.  Patient is postop day 1 from exploratory laparotomy and enterotomy for removal of gallstone.  Notes that she feels well  this morning and tolerated procedure well.  She does note some diffuse abdominal tenderness, no nausea, vomiting, unable to pass gas.  NG tube removed, currently NPO.    Objective Data:     Objective Information:      T   P  R  BP   MAP  SpO2   Value  36.4  96  20  142/67   96  92%  Date/Time 9/23 7:23 9/23 7:23 9/23 7:23 9/23 7:23 9/23 7:23 9/23 4:33  Range  (36.2C - 37.3C )  (86 - 114 )  (11 - 20 )  (128 - 156 )/ (58 - 72 )  (84 - 96 )  (92% - 98% )   As of 23-Sep-2023 04:00:00, patient is on 2 L/min of oxygen via room air.  Highest temp of 37.3 C was recorded at 9/22 8:30      Pain reported at 9/23 10:45: 8 = Severe    ---- Intake and Output  -----  Mn/Dy/Year Time  Intake   Output  Net  Sep 23, 2023 6:00 am  700   250  450  Sep 22, 2023 10:00 pm  2225   845  1380  Sep 22, 2023 2:00 pm  0   300  -300    The Intake and Output Totals for the last 24 hours are:      Intake   Output  Net      2925   1395  1530    Physical Exam Narrative:  ·  Physical Exam:    Constitutional: Well developed, no distress, alert and cooperative.  Eyes: EOMI, clear sclera  Head/Neck: Normocephalic, atraumatic without lesions  Respiratory/Thorax: Airways CTA bilaterally, no accessory muscle use  Cardiovascular: Regular rhythm, no murmurs, 2+ equal pulses of the extremities, normal S1 and S2  Gastrointestinal: Soft, nondistended, diffuse tenderness to palpation.  No rebound tenderness or guarding, +BS in all four quadrants.    Musculoskeletal: Normal and Equal strength  Extremities: No edema  Neurological: alert and oriented x3, intact senses, motor  Psychological: Appropriate mood and behavior  Skin: Surgical site intact without any  surrounding erythema.    Medication:    Medications:          Continuous Medications       --------------------------------    1. Lactated Ringers Infusion:  1000  mL  IntraVenous  <Continuous>         Scheduled Medications       --------------------------------    1. Acetaminophen:  650  mg  Oral  Every 6 Hours    2. Heparin SubCutaneous:  5000  unit(s)  SubCutaneous  Every 8 Hours    3. Lidocaine 2% Topical Gel:  1  application(s)  Topical  Once    4. Pantoprazole Injectable:  40  mg  IntraVenous Push  Every 24 Hours    5. Piperacillin - Tazobactam 3.375 gram/Iso-osmotic 50 mL Premix IVPB:  50  mL  IntraVenous Piggyback  Every 8 Hours         PRN Medications       --------------------------------    1. HYDROmorphone Injectable:  0.2  mg  IntraVenous Push  Every 2 Hours    2. Ondansetron Injectable:  4  mg  IntraVenous Push  Every 6 Hours    3. oxyCODONE Immediate Release:  5  mg  Oral  Every 4 Hours    4. oxyCODONE Immediate Release:  10  mg  Oral  Every 4 Hours    5. Sore Throat Lozenge:  1  lozenge(s)  Oral  Every 2 Hours        Recent Lab Results:    Results:        I have reviewed these laboratory results:    Complete Blood Count  23-Sep-2023 05:27:00      Result Value    White Blood Cell Count  14.2   H   Nucleated Erythrocyte Count  0.0    Red Blood Cell Count  3.72   L   HGB  11.4   L   HCT  34.9   L   MCV  94    MCHC  32.7    PLT  199    RDW-CV  12.5      Comprehensive Metabolic Panel  23-Sep-2023 05:27:00      Result Value    Glucose, Serum  145   H   NA  136    K  4.2    CL  104    Bicarbonate, Serum  25    Anion Gap, Serum  11    BUN  21    CREAT  1.12   H   GFR Female  47   A   Calcium, Serum  7.7   L   ALB  3.0   L   ALKP  51    T Pro  5.2   L   T Bili  0.9    Alanine Aminotransferase, Serum  190   H   Aspartate Transaminase, Serum  106   H     Magnesium, Serum  23-Sep-2023 05:27:00      Result Value    Magnesium, Serum  1.92      Urinalysis with Culture if Indicated  22-Sep-2023 15:08:00      Result  Value    Color, Urine  YELLOW  Reference Range: STRAW,YELLOW    Appearance, Urine  CLEAR    Specific Gravity, Urine  1.023    pH, Urine  7.0    Protein, Urine  30(1+)   A   Glucose, Urine  NEGATIVE    Blood, Urine  NEGATIVE    Ketones, Urine  NEGATIVE    Bilirubin, Urine  NEGATIVE    Urobilinogen, Urine  4.0   H   Nitrite, Urine  NEGATIVE    Leukocyte Esterase, Urine  NEGATIVE      Urinalysis, Microscopic  22-Sep-2023 15:08:00      Result Value    White Cells  0-5    Red Blood Cells  5-20   A       Radiology Results:    Results:        Impression:       Nasogastric tube tip gastric fundus.        Signed by Johnnie Olivera MD     Xray Abdomen AP View [Sep 22 2023  5:56AM]      Impression:    As mentioned in the body of the report there are multiple pulmonary  nodules in the bilateral lung bases measuring up to 6 mm in diameter.   Follow-up is recommended per Fleischner Society guidelines.        Fleischner Society 2017 Guidelines for Management of Incidentally  Detected Pulmonary Nodules in Adults:     A.  SOLID NODULES*  Nodule Typeand Size:  Single:  *Low Risk**   < 6 mm - No routine follow-up  6-8 mm - CT at 6-12 months, then consider CT at 18-24 months  > 8 mm - Consider CT at 3 months, PET/CT, or tissue sampling  *High Risk**  < 6 mm - Optional CT at 12 months  6-8 mm - CT at 6-12 months, then CT at 18-24 months  > 8 mm - Consider CT at 3 months, PET/CT, or tissue sampling  Multiple:  *Low Risk**   < 6 mm - No routine follow-up  6-8 mm - CT at 3-6 months, then consider CT at 18-24 months  > 8 mm - CT at 3-6 months, thenconsider CT at 18-24 months  *High Risk**  < 6 mm - Optional CT at 12 months  6-8 mm - CT at 3-6 months, then at 18-24 months  > 8 mm - CT at 3-6 months, then at 18-24 months     B. SUBSOLID NODULES*  Nodule Type and Size:  Single:    *Ground glass  < 6 mm - No routine follow-up  > 6 mm - CT at 6-12 months to confirm persistence, then CT every 2  years until 5 years  *Part Solid  < 6 mm - No  routine follow-up  > 6 mm - CT at 3-6 months to confirm persistence.  If unchanged and  solid component remains < 6 mm, annual CT should be performed for 5  years.  Multiple:  < 6 mm - CT at 3-6 months.  If stable, consider CT at 2 and 4 years.  > 6 mm - CT at 3-6 months.  Subsequent management based on the most  suspicious nodule(s).     Note - These recommendations do not apply to lung cancer screening,  patients with immunosuppression, or patients with known primary  cancer.  * Dimensions are average of long and short axes, rounded to the  nearest millimeter.  ** Consider all relevant risk factors.        Signed by Kapil Reed  Addendum Ends  STUDY:  CT Abdomen and Pelvis without IV Contrast; 09/22/2023 3:51 pm     INDICATION:  Lower abdominal pain.  Nausea     COMPARISON:  CT A/P 12/22/2022;  MR Kidney 07/18/2023.     ACCESSION NUMBER(S):  [id number]     ORDERING CLINICIAN:  SHIRLEY JEAN BAPTISTE DO     TECHNIQUE:  CT of the abdomen and pelvis was performed.  Contiguous axial images  were obtained at 3 mm slice thickness through the abdomen and pelvis.   Coronal and sagittal reconstructions at 3 mm slice thickness were  performed. No intravenous contrast was administered.       Automated mA/kV exposure control was utilized and patient examination  was performed in strict accordance with principles of ALARA.     FINDINGS:  Please notethat the evaluation of vessels, lymph nodes and organs is  limited without intravenous contrast.      LOWER CHEST:  Cardiac size is normal with mild partially visualized coronary  atherosclerosis.  No pericardial effusion.  Atelectasis is seen at the  left lung base.  Multiple pulmonary nodules are seen in the bilateral  lung bases measuring up to 6 mm in diameter.  Calcified granuloma is  seen in the right lung base.  Mild calcified plaque is seen in the  descending thoracic aorta.       ABDOMEN:     LIVER:  No hepatomegaly.  Smooth surface contour.  Normal attenuation.     BILE  DUCTS:  No intrahepatic or extrahepatic biliary ductal dilatation.     GALLBLADDER:  Gallbladder is thick-walled and contracted with small locules of air  seen in the lumen of the gallbladder and question of noncalcified  stones.  Left hepatic pneumobilia is noted.  Simple fluid density  cysts are seen in the liver.       STOMACH:  Small sliding-type hiatal hernia is noted.       PANCREAS:  Mild pancreatic atrophy is noted.  No masses or ductal dilatation.     SPLEEN:  No splenomegaly or focal splenic lesion.     ADRENAL GLANDS:  No thickening or nodules.     KIDNEYS AND URETERS:  Kidneys are normal in size and location.  Mild to moderate renal  cortical thinning is seen bilaterally.  No renal or ureteral calculi.     PELVIS:     BLADDER:  No abnormalities identified.     REPRODUCTIVE ORGANS:  No acute uterine or adnexal pathology is identified.     BOWEL:  There is wall thickening and inflammation of thesecond portion of the  duodenum adjacent to the gallbladder fossa.  Wall thickening and  inflammation is also seen of the terminal ileum with suggestion of a  peripherally calcified calculus measuring 2.1 cm in diameter at the  ileocecal valve which may represent gallstone impacted at the  ileocecal valve.  Appendix is not definitively identified and may  extend into a small right inguinal hernia containing fluid.   Diverticulosis is seen in the colon.       VESSELS:  Moderate calcified atheromatous plaque is seen in the abdominal aorta  with mild calcified plaque in the iliac arteries.       PERITONEUM/RETROPERITONEUM/LYMPH NODES:  Moderate amount of simple free fluid is seen in the pelvis.  No  pneumoperitoneum.     No lymphadenopathy.     ABDOMINAL WALL:  No abnormalities identified.     SOFT TISSUES:   No abnormalities identified.     BONES:  There is a mild levoconvex curvature of the lumbar spine centered at  L4.  Mild to moderate disc disease is seen at L2-3, L3-4, L4-5, and  L5-S1.  Generalized osseous  demineralization is noted.  There is mild  to moderate joint space narrowing in both hips.  No acute fracture or  aggressive osseous lesion.     IMPRESSION:  Wall thickening and inflammation of the gallbladder, second portion of  the duodenum, and terminal ileum with suggestion of a peripherally  calcified stone at the ileocecal valve.  Findings suggest possibility  of developing gallstone ileus caused by erosion of the gallstone  through the gallbladder wall into the duodenum, and traveling through  the small bowel and obstructing the ileocecal valve.  No definite  bowel obstruction is appreciated at this time however developing  obstruction is suspected.        Signed by Kapil Reed     CT Abdomen and Pelvis without Contrast [Sep 22 2023  4:43AM]      Impression:    Numerous scattered bilateral pulmonary nodules measuring 5 mm or less  in size, not significantly changed when compared to the previous  examination. No new pulmonary nodules.     CT Chest without Contrast [Jul 27 2023  6:51PM]      Impression:    1.  7 mm right interpolar region exophytic proteinaceous or  hemorrhagic cyst. 9 mm left simple cyst. No suspicious renal masses  or enhancement bilaterally.  2. Multiple bilobar simple hepatic cysts, the largest measures 4.5 x  3.2 cm.  3. Mild intrahepatic and common bile duct dilation. Cholelithiasis  without cholecystitis.  4. Extensive colonic diverticulosis without evidence of  diverticulitis.           MRI Kidney w/wo Contrast [Jul 19 2023 12:17PM]      Impression:    1 cm cyst of the right kidney which appears partially complex.  Consider further evaluation with renal mass protocol MRI for further  assessment.     Ultrasound Renal Bilateral [Jun 28 2023 11:45PM]      Assessment and Plan:   Daily Risk Screen:  ·  Does patient have an indwelling urinary catheter? yes   ·  Plan for indwelling urinary catheter removal today? yes     Code Status:  ·  Code Status Full Code     Assessment:    89yo F with  PMHX CKD Stage III, HTN, HLD, CAD s/p stent, GERD, gallstones presenting to ER with abdominal pain. Pain diffuse across abdomen and is accompanied with  slight nausea. Of note pt is COVID recovered with most recent positive 09-06-23. In ED pt had a mild leukocytosis at 12.1 with neutrophil predominance. Cr was elevated at 1.26 with GFR of 40. ALT and AST elevated at 77 and 160 respectively. UA 2+ leuks  with only 5-20 WBCs. Lipase negative to rule out pancreatitis. CT abdomen and pelvis done given pts symptoms, revealed: wall thickening and inflammation of the gallbladder, second portion of the duodenum, and terminal ileum with suggestion of a peripherally  calcified stone at the ileocecal valve. Likely gallstone ileus caused by erosion of the gallstone through the gallbladder wall into the duodenum, and traveling through the small bowel and obstructing the ileocecal valve. Moderate stool burden also noted.  Likely pt has symptoms 2/2 to gallstone ileus.  Patient underwent surgery with Dr. Lord on 9/22 for exploratory laparotomy and enterotomy for removal of gallstone followed by primary closure in 2 layers.  Tolerated procedure well, advancing diet per Dr. Lord's recommendations.    #Gallstone ileus with severe stool burden, s/p exploratory laparotomy with enterotomy for removal of gallstone (9/22)  #SIRS with tachycardia and leukocytosis, possible sepsis  -Tolerated procedure well with Dr. Lord yesterday (9/22), surgical site well intact  -NG tube removed, patient remain n.p.o. per Dr. Lord  -Defer to surgery team on diet advancement  -Continue IV Zosyn  -Blood cultures negative to date, will wait for 48 hours of negative blood cultures  -Oxycodone as needed for pain, Zofran as needed for nausea  -LR 75 cc/h per surgery team  -Reassess clinical status tomorrow    #CKD Stage III  - renally dose medications  - mIVF for fluid resuscitation   - continue to trend labs    #HTN  #HLD  #GERD  -Continue home  medications    Consults: Surgery  Diet: NPO  Fluids: mIVF  DVTppx: Heparin    FULL CODE    Discussed with attending,    Ashly Beth DO  Internal Medicine, PGY-3     Attestation:   Note Completion:  I am a:  Resident/Fellow   Attending Attestation I saw and evaluated the patient.  I personally obtained the key and critical portions of the history and physical exam or was physically present for key and  critical portions performed by the resident/fellow. I reviewed the resident/fellow?s documentation and discussed the patient with the resident/fellow.  I agree with the resident/fellow?s medical decision making as documented in the note.     I personally evaluated the patient on 23-Sep-2023   Comments/ Additional Findings    Patient seen and examined, no acute events overnight. Her NG tube was removed.  She is doing well, postop day #1 for gallstone ileus. She had exploratory  laparotomy and enterotomy for removal of the gallstone followed by primary closure. She denies chest pain or shortness of breath.  She does have some postoperative abdominal pain.  I did encourage her to continue her pain control and to stay on top of  it. Dr Lord surgery recommends continuing NPO, IVF, zosyn at this time. Will defer to surgery when she is able to advance her diet. Will continue supportive care and follow patient closely.          Electronic Signatures:  Ashly Beth ( (Resident))  (Signed 23-Sep-2023 11:16)   Authored: Service, Subjective Data, Objective Data, Assessment  and Plan, Note Completion  Chio Gregory ()  (Signed 23-Sep-2023 13:33)   Authored: Note Completion   Co-Signer: Service, Subjective Data, Objective Data, Assessment and Plan, Note Completion      Last Updated: 23-Sep-2023 13:33 by Chio Gregory ()

## 2023-09-30 NOTE — H&P
History of Present Illness:   HPI:    89 YOF PMH CKD stage III presenting to [facility] ED C/O abdominal pain.    Pain began a couple hours prior to presentation, and described as diffuse sudden onset pain that happened at rest.  It doesnt radiate anywhere, but described as a sharp pain.  She tried taking tums and did not give her any relief.  Endorses associated  symptom of worsening constipation for about 5 days.  Denies fever, chills, night sweats, chest pain, palpitations, dyspnea, diarrhea, or rash.    In the ED and initial vitals she was afebrile, HDS mildly tachycardic at 100 and satting appropriately on room air.  BC significant for mild leukocytosis of 12.1 with neutrophil predominance.   CMP showed serum creatinine of 1.26 (baseline 1.2-1.3).  Bass notably 1.2 urinalysis grossly unremarkable except for moderate leuk esterase and 5-20 white cells.  CT ABD/pelvis: Wall thickening and inflammation of the gallbladder, second portion of the  duodenum, and terminal ileum with suggestion of a peripherally calcified stone at the ileocecal valve.  Findings suggest possibility of developing gallstone ileus caused by erosion of the gallstone through the gallbladder wall into the duodenum, and traveling  through the small bowel and obstructing the ileocecal valve.  General surgery was contacted recommended admission, as well as NG tube placement for decompression on intermittent suction, and would be evaluated by general surgery this morning and most  likely require surgery.  Was given Zosyn, morphine, Zofran and IVF in the ED and admitted to Dr. JOEL service for gallstone ileus and possible forming small bowel obstruction.    A 10 point review of systems was performed positives and pertinent negatives reported in HPI     PMHx: CAD, HTN, dyslipidemia, GERD, gallstones, SARS-CoV-2 Aug 2023, NIDDM2,   Surgical Hx: PCI  Social Hx: Denies  tobacco, ETOH use, illicit drug use  Family Hx: Noncontributory    FULL  CODE        Comorbidities:   Comorbidites:  ·  Comorbid Conditions chronic kidney disease, hypertension   ·  Chronic Kidney Disease hypertension   ·  CKD Stage Stage 3     Social History:   Social History:  Smoking Status never smoker (1)   Alcohol Use denies(1)   Drug Use denies (1)              Allergies:  ·  No Known Allergies :     Medications Prior to Admission:   Admission Medication Reconciliation has not been completed for this patient.    Objective:     Objective Information:      T   P  R  BP   MAP  SpO2   Value  36.3  100  18  154/67   96  93%  Date/Time 9/22 2:02 9/22 4:30 9/22 3:33 9/22 4:30  9/22 4:30 9/22 4:30  Range  (36.3C - 36.3C )  (97 - 102 )  (18 - 18 )  (149 - 156 )/ (67 - 72 )  (96 - 96 )  (93% - 98% )      Pain reported at 9/22 4:30: sleeping    Physical Exam Narrative:  ·  Physical Exam:    Constitutional: Well developed, 89 YOF no distress, alert and cooperative.  Eyes: EOMI, clear sclera  ENT: NG tube in place, with mild output, no blood  Head/Neck: Normocephalic, atraumatic without lesions  Respiratory/Thorax: Airways CTA bilaterally, no accessory muscle use, no wheezing, rales or rhonchi  Cardiovascular: Regular Rate, Regular rhythm, no murmurs, 2+ equal pulses of the extremities, normal S1 and S2  Gastrointestinal: Nondistended, soft, diffusely tender, no rebound tenderness or guarding, +BS in all four quadrants  Musculoskeletal: Normal and Equal strength  Extremities: No edema  Neurological: alert and oriented x3, intact senses, normal motor strength   Psychological: Appropriate mood, behavior and affect  Skin: Warm and dry, No Rashes     Medications:    Medications:          Continuous Medications       --------------------------------    1. Dextrose 5% - NaCL 0.9% Infusion:  1000  mL  IntraVenous  <Continuous>         Scheduled Medications       --------------------------------    1. Iohexol (Omnipaque 350-Radiology Contrast):  71.1  mL  IntraVenous Push  Once    2. Lidocaine 2%  Topical Gel:  1  application(s)  Topical  Once    3. Piperacillin - Tazobactam 2.25 grams/Iso-osmotic 50 mL Premix IVPB:  50  mL  IntraVenous Piggyback  Every 6 Hours         PRN Medications       --------------------------------    1. HYDROmorphone Injectable:  0.5  mg  IntraVenous Push  Every 4 Hours    2. HYDROmorphone Injectable:  1  mg  IntraVenous Push  Every 4 Hours    3. Sodium Chloride 0.9% Injectable Flush:  10  mL  IntraVenous Flush  Every 8 Hours and as Needed        Recent Lab Results:    Results:    CBC: 9/22/2023 02:34              \     Hgb     /                              \     12.5       /  WBC  ----------------  Plt               12.1 H    ----------------    256              /     Hct     \                              /     38.5       \            RBC: 4.12     MCV: 93     Neutrophil %: 84.7      CMP: 9/22/2023 02:34  NA+        Cl-     BUN  /                         135 L   101    29 H  /  --------------------------------  Glucose                ---------------------------  200 H    K+     HCO3-   Creat \                         4.3    27    1.26 H \           \  T Bili  /                    \  0.7  /  AST  x ---- x ALT        160 Hx ---- x 77 H         /  Alk P   \               /  77  \  Calcium : 9.1     Anion Gap : 11     Albumin : 3.9     T Protein : 6.5             I have reviewed these laboratory results:    Lactate, Level  22-Sep-2023 03:33:00      Result Value    Lactate, Level  1.2      Urinalysis, Microscopic  22-Sep-2023 02:50:00      Result Value    White Cells  5-20   A   Red Blood Cells  0-5    Epithelial Cells, Squamous  1      Urinalysis with Culture if Indicated  22-Sep-2023 02:50:00      Result Value    Color, Urine  YELLOW    Reference Range: STRAW,YELLOW    Appearance, Urine  CLEAR    Specific Gravity, Urine  1.018    pH, Urine  6.0    Protein, Urine  NEGATIVE    Glucose, Urine  NEGATIVE    Blood, Urine  NEGATIVE    Ketones, Urine  NEGATIVE    Bilirubin, Urine   NEGATIVE    Urobilinogen, Urine  2.0   H   Nitrite, Urine  NEGATIVE    Leukocyte Esterase, Urine  MODERATE(2+)   A     Complete Blood Count + Differential  22-Sep-2023 02:34:00      Result Value    White Blood Cell Count  12.1   H   Nucleated Erythrocyte Count  0.0    Red Blood Cell Count  4.12    HGB  12.5    HCT  38.5    MCV  93    MCHC  32.5    PLT  256    RDW-CV  12.2    Neutrophil %  84.7    Immature Granulocytes %  0.5    Lymphocyte %  6.5    Monocyte %  6.7    Eosinophil %  1.4    Basophil %  0.2    Neutrophil Count  10.26   H   Lymphocyte Count  0.79   L   Monocyte Count  0.81   H   Eosinophil Count  0.17    Basophil Count  0.03      Comprehensive Metabolic Panel  22-Sep-2023 02:34:00      Result Value    Glucose, Serum  200   H   NA  135   L   K  4.3    CL  101    Bicarbonate, Serum  27    Anion Gap, Serum  11    BUN  29   H   CREAT  1.26   H   GFR Female  40   A   Calcium, Serum  9.1    ALB  3.9    ALKP  77    T Pro  6.5    T Bili  0.7    Alanine Aminotransferase, Serum  77   H   Aspartate Transaminase, Serum  160   H     Lipase, Serum  22-Sep-2023 02:34:00      Result Value    Lipase, Serum  67        Radiology Results:    Results:        Impression:    As mentioned in the body of the report there are multiple pulmonary  nodules in the bilateral lung bases measuring up to 6 mm in diameter.   Follow-up is recommended per Fleischner Society guidelines.        Fleischner Society 2017 Guidelines for Management of Incidentally  Detected Pulmonary Nodules in Adults:     A.  SOLID NODULES*  Nodule Typeand Size:  Single:  *Low Risk**   < 6 mm - No routine follow-up  6-8 mm - CT at 6-12 months, then consider CT at 18-24 months  > 8 mm - Consider CT at 3 months, PET/CT, or tissue sampling  *High Risk**  < 6 mm - Optional CT at 12 months  6-8 mm - CT at 6-12 months, then CT at 18-24 months  > 8 mm - Consider CT at 3 months, PET/CT, or tissue sampling  Multiple:  *Low Risk**   < 6 mm - No routine follow-up  6-8 mm -  CT at 3-6 months, then consider CT at 18-24 months  > 8 mm - CT at 3-6 months, thenconsider CT at 18-24 months  *High Risk**  < 6 mm - Optional CT at 12 months  6-8 mm - CT at 3-6 months, then at 18-24 months  > 8 mm - CT at 3-6 months, then at 18-24 months     B. SUBSOLID NODULES*  Nodule Type and Size:  Single:    *Ground glass  < 6 mm - No routine follow-up  > 6 mm - CT at 6-12 months to confirm persistence, then CT every 2  years until 5 years  *Part Solid  < 6 mm - No routine follow-up  > 6 mm - CT at 3-6 months to confirm persistence.  If unchanged and  solid component remains < 6 mm, annual CT should be performed for 5  years.  Multiple:  < 6 mm - CT at 3-6 months.  If stable, consider CT at 2 and 4 years.  > 6 mm - CT at 3-6 months.  Subsequent management based on the most  suspicious nodule(s).     Note - These recommendations do not apply to lung cancer screening,  patients with immunosuppression, or patients with known primary  cancer.  * Dimensions are average of long and short axes, rounded to the  nearest millimeter.  ** Consider all relevant risk factors.        Signed by Kapil Reed  Addendum Ends  STUDY:  CT Abdomen and Pelvis without IV Contrast; 09/22/2023 3:51 pm     INDICATION:  Lower abdominal pain.  Nausea     COMPARISON:  CT A/P 12/22/2022;  MR Kidney 07/18/2023.     ACCESSION NUMBER(S):  [id number]     ORDERING CLINICIAN:  SHIRLEY JEAN BAPTISTE DO     TECHNIQUE:  CT of the abdomen and pelvis was performed.  Contiguous axial images  were obtained at 3 mm slice thickness through the abdomen and pelvis.   Coronal and sagittal reconstructions at 3 mm slice thickness were  performed. No intravenous contrast was administered.       Automated mA/kV exposure control was utilized and patient examination  was performed in strict accordance with principles of ALARA.     FINDINGS:  Please notethat the evaluation of vessels, lymph nodes and organs is  limited without intravenous contrast.      LOWER  CHEST:  Cardiac size is normal with mild partially visualized coronary  atherosclerosis.  No pericardial effusion.  Atelectasis is seen at the  left lung base.  Multiple pulmonary nodules are seen in the bilateral  lung bases measuring up to 6 mm in diameter.  Calcified granuloma is  seen in the right lung base.  Mild calcified plaque is seen in the  descending thoracic aorta.       ABDOMEN:     LIVER:  No hepatomegaly.  Smooth surface contour.  Normal attenuation.     BILE DUCTS:  No intrahepatic or extrahepatic biliary ductal dilatation.     GALLBLADDER:  Gallbladder is thick-walled and contracted with small locules of air  seen in the lumen of the gallbladder and question of noncalcified  stones.  Left hepatic pneumobilia is noted.  Simple fluid density  cysts are seen in the liver.       STOMACH:  Small sliding-type hiatal hernia is noted.       PANCREAS:  Mild pancreatic atrophy is noted.  No masses or ductal dilatation.     SPLEEN:  No splenomegaly or focal splenic lesion.     ADRENAL GLANDS:  No thickening or nodules.     KIDNEYS AND URETERS:  Kidneys are normal in size and location.  Mild to moderate renal  cortical thinning is seen bilaterally.  No renal or ureteral calculi.     PELVIS:     BLADDER:  No abnormalities identified.     REPRODUCTIVE ORGANS:  No acute uterine or adnexal pathology is identified.     BOWEL:  There is wall thickening and inflammation of thesecond portion of the  duodenum adjacent to the gallbladder fossa.  Wall thickening and  inflammation is also seen of the terminal ileum with suggestion of a  peripherally calcified calculus measuring 2.1 cm in diameter at the  ileocecal valve which may represent gallstone impacted at the  ileocecal valve.  Appendix is not definitively identified and may  extend into a small right inguinal hernia containing fluid.   Diverticulosis is seen in the colon.       VESSELS:  Moderate calcified atheromatous plaque is seen in the abdominal aorta  with  mild calcified plaque in the iliac arteries.       PERITONEUM/RETROPERITONEUM/LYMPH NODES:  Moderate amount of simple free fluid is seen in the pelvis.  No  pneumoperitoneum.     No lymphadenopathy.     ABDOMINAL WALL:  No abnormalities identified.     SOFT TISSUES:   No abnormalities identified.     BONES:  There is a mild levoconvex curvature of the lumbar spine centered at  L4.  Mild to moderate disc disease is seen at L2-3, L3-4, L4-5, and  L5-S1.  Generalized osseous demineralization is noted.  There is mild  to moderate joint space narrowing in both hips.  No acute fracture or  aggressive osseous lesion.     IMPRESSION:  Wall thickening and inflammation of the gallbladder, second portion of  the duodenum, and terminal ileum with suggestion of a peripherally  calcified stone at the ileocecal valve.  Findings suggest possibility  of developing gallstone ileus caused by erosion of the gallstone  through the gallbladder wall into the duodenum, and traveling through  the small bowel and obstructing the ileocecal valve.  No definite  bowel obstruction is appreciated at this time however developing  obstruction is suspected.        Signed by Kapil Reed     CT Abdomen and Pelvis without Contrast [Sep 22 2023  4:43AM]      Impression:    Numerous scattered bilateral pulmonary nodules measuring 5 mm or less  in size, not significantly changed when compared to the previous  examination. No new pulmonary nodules.     CT Chest without Contrast [Jul 27 2023  6:51PM]      Assessment and Plan:   Assessment:    89yo F with PMHX CKD Stage III, HTN, HLD, CAD s/p stent, GERD, gallstones presenting to ER with abdominal pain. Pain diffuse across abdomen and is accompanied with  slight nausea. Of note pt is COVID recovered with most recent positive 09-06-23. In ED pt had a mild leukocytosis at 12.1 with neutrophil predominance. Cr was elevated at 1.26 with GFR of 40. ALT and AST elevated at 77 and 160 respectively. UA 2+ leuks   with only 5-20 WBCs. Lipase negative to rule out pancreatitis. CT abdomen and pelvis done given pts symptoms, revealed: wall thickening and inflammation of the gallbladder, second portion of the duodenum, and terminal ileum with suggestion of a peripherally  calcified stone at the ileocecal valve. Likely gallstone ileus caused by erosion of the gallstone through the gallbladder wall into the duodenum, and traveling through the small bowel and obstructing the ileocecal valve. Moderate stool burden also noted.  Likely pt has symptoms 2/2 to gallstone ileus, surgery consulted from ED; pt to be taken to OR later today.    #Gallstone ileus with severe stool burden  #SIRS with tachycardia and leukocytosis, possible sepsis  - Pt given zosyn in ED, will continue renally dosed q6h  - pt bolused with fluids per sepsis protocol in ED, continuing mIVF  - NPO per surgery for likely OR  - CT abdomen revealed likely gallstone ileus (full results above)  - WBC at 12.1, tachycardic at 114  - BCx2 drawn, pending  - UA 2+ leuks, 5-20 WBCs  - Urine culture drawn, pending  - Surgery consulted, rec NG tube placement and likely OR today  - dilaudid as needed for pain, .5mg q4h mod, 1mg q4h severe  - bowel regiment to start post op    #CKD Stage III  - renally dose medications  - mIVF for fluid resuscitation   - s/p sepsis bolus in ER  - continue to trend labs    #HTN  #HLD  #GERD  - holding home meds in setting of likely OR per surgery  - cont post-op as appropriate    Consults: Surgery  Diet: NPO  Fluids: mIVF  DVTppx: SCDs; pharm held for OR    FULL CODE    Staffed with attending,    Jonathan uDbois DO  Family Medicine PGY-2    Attestation:   Note Completion:  I am a:  Resident/Fellow   Attending Attestation I saw and evaluated the patient.  I personally obtained the key and critical portions of the history and physical exam or was physically present for key and  critical portions performed by the resident/fellow. I reviewed the  resident/fellow?s documentation and discussed the patient with the resident/fellow.  I agree with the resident/fellow?s medical decision making as documented in the note.     I personally evaluated the patient on 22-Sep-2023   Comments/ Additional Findings    Patient seen and examined, I agree with above documentation from resident physician. Patient is a 89-year-old female with history of CKD, CAD, hypertension,  gallstones, diabetes who presented to [facility] with acute onset abdominal pain.  Patient has had worsening constipation over the past 5 days. She was found to be tachycardic. CT of the abdomen showed wall thickening and inflammation of  the gallbladder, second portion of the duodenum and terminal ileum with suggestion of a peripherally calcified stone at the ileocecal valve.  There is concern of gallstone ileus caused by erosion of the gallstone through the gallbladder wall into the  duodenum and obstructing the ileocecal valve. General surgery Dr. Lord was consulted who recommended NG tube placement to LIS, n.p.o., IV fluids, IV Zosyn and OR.  Patient was admitted for gallstone ileus and cholecystitis. She was doing well this morning.  She denies chest pain or shortness of breath. Her abdominal pain is controlled with IV Dilaudid. Her vitals are stable.  We will continue supportive care and follow patient closely.          Electronic Signatures:  Haroon Moise (DO (Resident))  (Signed 22-Sep-2023 05:53)   Entered: History of Present Illness, Social History,  Allergies, Medications Prior to Admission, Objective, Assessment and Plan   Authored: History of Present Illness, Comorbidities, Social History, Allergies, Medications Prior to Admission, Objective, Assessment and  Plan, Note Completion  Chio Gregory (DO)  (Signed 22-Sep-2023 16:08)   Authored: Note Completion   Co-Signer: Comorbidities, Assessment and Plan, Note Completion  Jonathan Dubois (DO (Resident))  (Signed 22-Sep-2023  "05:46)   Authored: Comorbidities, Assessment and Plan, Note Completion      Last Updated: 22-Sep-2023 16:08 by Chio Gregory (DO)    References:  1.  Data Referenced From \"Risk Screen - Adult Emergency\" 22-Sep-2023 02:12   "

## 2023-09-30 NOTE — DISCHARGE SUMMARY
Send Summary:   Discharge Summary Providers:   Provider Role Provider Name   · Primary Gilson Aguillon       Note Recipients: Gilson Aguillon MD - [id number]  [preferred]       Discharge:    Summary:   Admission Date: .22-Sep-2023 01:57:00   Discharge Date: 27-Sep-2023   Attending Physician at Discharge: Chio Gregory   Admission Reason: Gallstone ileus   Final Discharge Diagnoses: Gallstone ileus   Procedures: Exploratory laparotomy with enterotomy  and stone extraction,  Closed in 2 layers.   Condition at Discharge: Fair   Disposition at Discharge: .Home   Vital Signs:        T   P  R  BP   MAP  SpO2   Value  37.1  93  16  137/77      97%  Date/Time 9/27 8:00 9/27 8:00 9/27 8:00 9/27 8:00    9/27 8:00  Range  (36.4C - 37.1C )  (63 - 93 )  (16 - 18 )  (113 - 166 )/ (74 - 80 )    (97% - 100% )  Highest temp of 37.1 C was recorded at 9/27 8:00    Date:            Weight/Scale Type:  Height:   27-Sep-2023 06:00  50.7  kg              Physical Exam:    General: Not in acute distress, A&O x 3, alert, cooperative, well-developed  HEENT: Normocephalic, atraumatic, EOMI, moist mucous membranes  Neck: Neck supple, trachea midline, no evidence of trauma  Cardiovascular: RRR, S1 and S2 appreciated, no murmurs rubs gallops appreciated, distal pulses 2+ bilaterally (radial and dorsalis pedis)  Respiratory: Vesicular breath sounds appreciated bilaterally, no adventitious sounds appreciated, no increased work of breathing  GI: Ex-Lap Incision with staples open to air, without erythema or drainage, Abdomen soft, nondistended, nontender to palpation, bowel sounds present  Extremities: No edema appreciated in lower extremities bilaterally, no cyanosis  Neuro: A&O X3, no focal deficits, strength and sensation intact bilaterally  Skin: Warm and dry, without lesions or rashes    Hospital Course:    Patient is a 89-year-old female with past medical history significant for CAD, HTN, DLD, GERD, gallstones, SARS-CoV-2 (08/23),  NIDDM 2, CKD stage III.  Patient presented to the  ED on 9/22/2023 complaining of abdominal pain that began a few hours prior to presentation.  Patient also endorsed associated constipation for 5 days prior to the abdominal pain, and denied any other symptoms.  In the ED patient's vitals were: Temp 97.3  F, HR 97, RR 18, /72, SPO2 98% ORA.  Patient's labs significant for WBC 12.1 (neutrophil predominance), sodium 135, BUN/CR 29/1.26 (at baseline), ALT/AST 77/160, UA negative.  Imaging obtained consisted of CT abdomen pelvis without contrast: Wall  thickening and inflammation of the gallbladder, second portion of duodenum, and terminal ileum with suggestion of peripherally calcified stone at the ileocecal valve findings suggest possibility of developing gallstone ileus caused by erosion of the gallstones  the gallbladder wall to the duodenum, entrapping the small bowel and obstructing ileocecal valve.  No definite bowel obstruction is appreciated this time however developing obstruction is suspected.  Chest x-ray post NG tube placed: NG tube in gastric  fundus.  General surgery was consulted for gallstone ileus.  Patient was taken to the operating room for exploratory laparotomy with enterotomy and stone extraction.  Patient tolerated the procedure well.    On 9/23/2023, patient had no acute events overnight, patient was hemodynamically stable and vital signs within normal limits with tachycardia max at 114, and  max.  Patient endorsed mild diffuse abdominal tenderness at this time.  Patient denied  passing flatus or bowel movement at that time, and NG tube was removed.  Patient was continued on empiric Zosyn, and oxycodone for pain.    On 9/24/2023, patient had no acute events overnight. Patient's vital signs were within normal limits overnight.  Patient began passing flatus but had not had a bowel movement.  Patient denies N/V.  Patient endorsed ambulation.  As per surgery's recommendations  patient  was started on clear liquid diet, and empiric Zosyn was continued, Zofran as needed for nausea, and oxycodone for pain.    On 9/25/2023, patient had no acute events overnight, and vital signs were within normal limits.  Patient continues to endorse flatus, and had one small bowel movement.  Patient endorsed ambulating and pain was well controlled.  As per surgery's recommendations  patient was advanced to full liquid diet.  Patient was also continued on Zosyn with plan to stop after 7-day course as per surgery's recommendations.    On 9/26/2023, patient had no acute events overnight, and vital signs continue to be stable within normal limits.  Patient endorsed 2 bowel movements overnight, and continues to pass flatus.  As per surgery's recommendations patient was advanced to GI  soft diet.  IV Zosyn was continued for antibiotic coverage as part of the 7-day course of antibiotics per surgery's recommendations.    On 9/27/2023, patient had no acute events overnight, patient's vital signs continue to be stable and within normal limits.  Patient endorses having bowel movements and tolerating diet well.  As per general surgery's recommendations patient is cleared  for discharge and to follow-up with them 2 weeks outpatient.  Also as per the recommendations patient will need additional 2 days of antibiotics will be covered with Augmentin 500 mg twice daily outpatient for 2 days.  At this time patient is medically  stable for discharge and deemed a good candidate from medicine perspective.      Discharge Information:    and Continuing Care:   Lab Results - Pending:    None  Radiology Results - Pending: None   Discharge Instructions:    Activity:           activity as tolerated.          May shower..  No tub bath/soaking/swimming until cleared          May not drive while taking narcotics.  and/or unable to perform necessary maneuvers          No pushing, pulling, or lifting objects greater than 10 pounds for 6 week(s).       Nutrition/Diet:           special diet          Special Diet:   low residue          Diet Consistency/Texture:   soft    Wound Care:           Wound Site:   Abdomen          Wound Type:   surgical incision          Change Dressing:   daily          Cleanse With:   soap and water          Cover With:   4x4 gauze          Tape With:   paper tape,  silk tape          Instructions:   no lotions, creams, or tub soaks    Additional Orders:           Additional Instructions:   Please follow-up with your primary care provider within 7 days for hospital follow-up.  Please call to make this appointment.  Please follow-up with your surgeon Dr. Lord in 14 days for postop follow-up.    Please take your medications as prescribed.  New medications added at this visit are as follows below.    -oxycodone-acetaminophen 5-325 mg take 1 tablet by mouth every 6 hours as needed for pain  - Augmentin 500-125 mg please take 1 tablet by mouth twice daily  until no more pills remain    If you have any new or worsening symptoms, seek medical attention.     Thank you for allowing us to participate in your medical care!    -[facility] [place]    Follow Up Appointments:    Follow-Up Appointment 01:           Physician/Dept/Service:   Dr. Johnnie Lord          Reason for Referral:   Post op follow up          Call to Schedule in:   2 weeks          Phone Number:   [phone]    Follow-Up Appointment 02:           Physician/Dept/Service:   Gilson Aguillon/PCP          Reason for Referral:   hospital follow-up          Phone Number:   [phone]    Discharge Medications: Home Medication   omeprazole 20 mg oral delayed release capsule - 1 cap(s) orally once a day  simvastatin 10 mg oral tablet - 1 tab(s) orally once a day (in the evening)  lisinopril 2.5 mg oral tablet - 1 tab(s) orally once a day  aspirin 81 mg oral delayed release tablet - 1 tab(s) orally once a day  Augmentin 500 mg-125 mg oral tablet  - 1 tab(s) orally 2 times a day      PRN Medication   oxycodone-acetaminophen 5 mg-325 mg oral tablet - 1 tab(s) orally every 6 hours x 1 days, As Needed for pain     DNR Status:   ·  Code Status Code Status order at time of discharge: Full Code     Attestation:   Note Completion:  I am a:  Resident/Fellow   Attending Attestation I saw and evaluated the patient.  I personally obtained the key and critical portions of the history and physical exam or was physically present for key and  critical portions performed by the resident/fellow. I reviewed the resident/fellow?s documentation and discussed the patient with the resident/fellow.  I agree with the resident/fellow?s medical decision making as documented in the note.     I personally evaluated the patient on 27-Sep-2023   Comments/ Additional Findings    patient seen and examined, I agree with above documentation from resident physician regarding DC summary. patient is a very pleasant 89 F who presented  with acute onset abdominal pain, Dx with gallstone ileus. General surgery Dr Lord consulted, she went for ex lap with enterotomy and stone extraction. She tolerated surgery well, her diet was advanced as tolerated, now she is on GI soft diet. Labs and  vitals are stable, she has been on IV zosyn plan for 2 days of augmentin at DC (renally dose 500mg BID x 2 days) at DC. Stable for DC home today, she is doing great and wants to go home. Follow up with PCP and gen surgery Dr Lord. Discussed plan of care  with patient, all questions answered.  spent>35 minutes DC patient          Electronic Signatures:  Chio Gregory (DO)  (Signed 27-Sep-2023 21:17)   Authored: Send Summary, Note Completion   Co-Signer: Send Summary, Summary Content, Ongoing Care, DNR Status, Note Completion  Julio Mayfield (Resident))  (Signed 27-Sep-2023 12:02)   Authored: Send Summary, Summary Content, Ongoing Care,  DNR Status, Note Completion      Last Updated: 27-Sep-2023 21:17 by Bri  Chio ABDALLA)

## 2023-09-30 NOTE — PROGRESS NOTES
Service: Surgery     Subjective Data:   DOIGO SANTIZO is a 89 year old Female who is Hospital Day # 2.    Additional Information:    Afebrile O/N.  No acute events O/N.  NGT removed for unclear reasons.  Denies nausea or emesis.  No flatus or BM yet.  Pain controlled.    Objective Data:     Objective Information:      T   P  R  BP   MAP  SpO2   Value  36.4  96  20  142/67   96  92%  Date/Time 9/23 7:23 9/23 7:23 9/23 7:23 9/23 7:23 9/23 7:23 9/23 4:33  Range  (36.2C - 37.3C )  (86 - 114 )  (11 - 20 )  (128 - 156 )/ (58 - 72 )  (84 - 96 )  (92% - 98% )   As of 23-Sep-2023 04:00:00, patient is on 2 L/min of oxygen via room air.  Highest temp of 37.3 C was recorded at 9/22 8:30      Pain reported at 9/23 7:23: 0 = None    ---- Intake and Output  -----  Mn/Dy/Year Time  Intake   Output  Net  Sep 23, 2023 6:00 am  700   250  450  Sep 22, 2023 10:00 pm  2225   845  1380  Sep 22, 2023 2:00 pm  0   300  -300    The Intake and Output Totals for the last 24 hours are:      Intake   Output  Net      2925   1395  1530         Weights   9/23 4:33: Weight in kg (Weight (kg))  51.7  9/23 4:33: Weight in lbs ((lbs))  113.9  9/22 6:29: BMI (kg/m2) (BMI (kg/m2))  19.313    Physical Exam by System:    Constitutional: Well developed, awake/alert/oriented  x3, no distress, alert and cooperative   Respiratory/Thorax: No respiratory distress.   Gastrointestinal: Soft, distended, appropriately  TTP, incision covered w/ dressing, mild shadowing.   Genitourinary: No peralta catheter present.   Psychological: Appropriate mood and behavior     Medication:    Medications:          Continuous Medications       --------------------------------    1. Lactated Ringers Infusion:  1000  mL  IntraVenous  <Continuous>         Scheduled Medications       --------------------------------    1. Acetaminophen:  650  mg  Oral  Every 6 Hours    2. Heparin SubCutaneous:  5000  unit(s)  SubCutaneous  Every 8 Hours    3. Lidocaine 2% Topical Gel:  1   application(s)  Topical  Once    4. Pantoprazole Injectable:  40  mg  IntraVenous Push  Every 24 Hours    5. Piperacillin - Tazobactam 3.375 gram/Iso-osmotic 50 mL Premix IVPB:  50  mL  IntraVenous Piggyback  Every 8 Hours         PRN Medications       --------------------------------    1. HYDROmorphone Injectable:  0.2  mg  IntraVenous Push  Every 2 Hours    2. Ondansetron Injectable:  4  mg  IntraVenous Push  Every 6 Hours    3. oxyCODONE Immediate Release:  5  mg  Oral  Every 4 Hours    4. oxyCODONE Immediate Release:  10  mg  Oral  Every 4 Hours    5. Sore Throat Lozenge:  1  lozenge(s)  Oral  Every 2 Hours        Recent Lab Results:    Results:    CBC: 9/23/2023 05:27              \     Hgb     /                              \     11.4 L    /  WBC  ----------------  Plt               14.2 H    ----------------    199              /     Hct     \                              /     34.9 L    \            RBC: 3.72 L    MCV: 94           CMP: 9/23/2023 05:27  NA+        Cl-     BUN  /                         136    104    21  /  --------------------------------  Glucose                ---------------------------  145 H    K+     HCO3-   Creat \                         4.2    25    1.12 H \           \  T Bili  /                    \  0.9  /  AST  x ---- x ALT        106 Hx ---- x 190 H         /  Alk P   \               /  51  \  Calcium : 7.7 L    Anion Gap : 11     Albumin : 3.0 L     T Protein : 5.2 L          Assessment and Plan:   Daily Risk Screen:  ·  Does patient have an indwelling urinary catheter? yes   ·  Plan for indwelling urinary catheter removal today? yes     Code Status:  ·  Code Status Full Code     Advance Care Planning:  Advance Care Planning: Patient didn't wish to or  was unable to provide advance care plan     Assessment:    POD#1 s/p exploratory laparotomy, enterotomy with extraction of obstructing gallstone, primary repair of enterotomy in two layers    #Gallstone ileus  -  S/P  above surgery  -  Continue NPO today given abdominal distention  -  If patient vomits, reinsert NGT to LIWS  -  OK for DVT chemoprophylaxis; on SC heparin  -  Agree with continuing empiric zosyn antibiotic at this time  -  Encourage IS and ambulation            Electronic Signatures:  Johnnie Lord)  (Signed 23-Sep-2023 08:26)   Authored: Service, Subjective Data, Objective Data, Assessment  and Plan, Note Completion      Last Updated: 23-Sep-2023 08:26 by Johnnie Lord)

## 2023-09-30 NOTE — PROGRESS NOTES
Service: Surgery     Subjective Data:   DIOGO SANTIZO is a 89 year old Female who is Hospital Day # 3.    Additional Information:    Afebrile O/N.  No acute events O/N.  Pain controlled.  Denies nausea or emesis.  Passed flatus yesterday.  Spent some time in chair but no ambulation yesterday.  Denies dysuria.    Objective Data:     Objective Information:      T   P  R  BP   MAP  SpO2   Value  36.4  78  10  130/63   91  93%  Date/Time 9/24 5:00 9/24 5:00 9/24 5:00 9/24 5:00  9/24 5:00 9/24 5:00  Range  (36.2C - 37.5C )  (78 - 104 )  (10 - 29 )  (129 - 146 )/ (61 - 68 )  (88 - 98 )  (92% - 95% )   As of 23-Sep-2023 04:00:00, patient is on 2 L/min of oxygen via room air.  Highest temp of 37.5 C was recorded at 9/23 20:00      Pain reported at 9/24 5:00: 0 = None    ---- Intake and Output  -----  Mn/Dy/Year Time  Intake   Output  Net  Sep 24, 2023 6:00 am  755   350  405  Sep 23, 2023 10:00 pm  380   250  130  Sep 23, 2023 2:00 pm  150   300  -150    The Intake and Output Totals for the last 24 hours are:      Intake   Output  Net      1285   900  385         Weights   9/23 4:33: Weight in kg (Weight (kg))  51.7  9/23 4:33: Weight in lbs ((lbs))  113.9    Physical Exam by System     Constitutional: Well developed, awake/alert/oriented  x3, no distress, alert and cooperative   Respiratory/Thorax: No respiratory distress.   Gastrointestinal: Soft, mildly distended, appropriately  TTP, incision C/D/I w/ staples.   Genitourinary: No peralta catheter present.   Psychological: Appropriate mood and behavior     Medication     Medications:          Continuous Medications       --------------------------------    1. Lactated Ringers Infusion:  1000  mL  IntraVenous  <Continuous>         Scheduled Medications       --------------------------------    1. Acetaminophen:  650  mg  Oral  Every 6 Hours    2. Heparin SubCutaneous:  5000  unit(s)  SubCutaneous  Every 8 Hours    3. Lidocaine 2% Topical Gel:  1  application(s)   Topical  Once    4. Pantoprazole Injectable:  40  mg  IntraVenous Push  Every 24 Hours    5. Piperacillin - Tazobactam 3.375 gram/Iso-osmotic 50 mL Premix IVPB:  50  mL  IntraVenous Piggyback  Every 8 Hours         PRN Medications       --------------------------------    1. HYDROmorphone Injectable:  0.2  mg  IntraVenous Push  Every 2 Hours    2. Ondansetron Injectable:  4  mg  IntraVenous Push  Every 6 Hours    3. oxyCODONE Immediate Release:  5  mg  Oral  Every 4 Hours    4. oxyCODONE Immediate Release:  10  mg  Oral  Every 4 Hours    5. Sore Throat Lozenge:  1  lozenge(s)  Oral  Every 2 Hours        Recent Lab Results     Results:    CBC: 9/24/2023 05:54              \     Hgb     /                              \     10.8 L    /  WBC  ----------------  Plt               9.1       ----------------    176              /     Hct     \                              /     33.8 L    \            RBC: 3.51 L    MCV: 96         Assessment and Plan:   Daily Risk Screen   ·  Does patient have an indwelling urinary catheter? yes   ·  Plan for indwelling urinary catheter removal today? yes     Code Status   ·  Code Status Full Code     Assessment:    POD#2 s/p exploratory laparotomy, enterotomy with extraction of obstructing gallstone, primary repair of enterotomy in two layers    #Gallstone ileus  -  S/P above surgery  -  Initiate clear liquid diet  -  OK for DVT chemoprophylaxis; on SC heparin  -  Agree with continuing empiric zosyn antibiotic at this time  -  Encourage IS and ambulation  -  Needs PT/OT          Electronic Signatures for Addendum Section:   Johnnie Lord) (Signed Addendum 24-Sep-2023 07:46)   ADDENDUM:    IVF discontinued     Electronic Signatures:  Johnnie Lord)  (Signed 24-Sep-2023 07:45)   Authored: Service, Subjective Data, Objective Data, Assessment  and Plan, Note Completion      Last Updated: 24-Sep-2023 07:46 by Johnnie Lord)

## 2023-09-30 NOTE — PROGRESS NOTES
Service: Medicine     Subjective Data:   DIOGO SANTIZO is a 89 year old Female who is Hospital Day # 4.    Additional Information:    -Patient seen this morning and resting comfortably in the chair  -Patient had no acute events overnight  -Patient's vital signs within normal limits overnight  -Patient was advanced to full liquid diet as per surgery's recommendation and is tolerating appropriately  -Patient's labs significant for creatinine 1.15, hemoglobin 11.7  -Patient endorsed mild abdominal pain, passing flatus and one small bowel movement    Objective Data:     Objective Information:      T   P  R  BP   MAP  SpO2   Value  37.1  97  16  128/66   89  96%  Date/Time 9/25 8:00 9/25 8:00 9/25 8:00 9/25 8:00  9/24 11:57 9/25 8:00  Range  (36.4C - 37.5C )  (72 - 97 )  (10 - 19 )  (116 - 158 )/ (56 - 70 )  (81 - 100 )  (93% - 96% )  Highest temp of 37.5 C was recorded at 9/24 8:14      Pain reported at 9/24 22:45: sleeping    ---- Intake and Output  -----  Mn/Dy/Year Time  Intake   Output  Net  Sep 25, 2023 6:00 am  50   0  50  Sep 24, 2023 10:00 pm  0   0  0    The Intake and Output Totals for the last 24 hours are:      Intake   Output  Net      125   null  null    Physical Exam Narrative:  ·  Physical Exam:    General: Not in acute distress, A&O x 3, alert, cooperative, well-developed  HEENT: Normocephalic, atraumatic, EOMI, moist mucous membranes  Neck: Neck supple, trachea midline, no evidence of trauma  Cardiovascular: RRR, S1 and S2 appreciated, no murmurs rubs gallops appreciated, distal pulses 2+ bilaterally (radial and dorsalis pedis)  Respiratory: Vesicular breath sounds appreciated bilaterally, no adventitious sounds appreciated, no increased work of breathing  GI: Ex-Lap Incision with staples open to air, without erythema or drainage, Abdomen soft, nondistended, nontender to palpation, bowel sounds present  Extremities: No edema appreciated in lower extremities bilaterally, no cyanosis  Neuro:  A&O X3, no focal deficits, strength and sensation intact bilaterally  Skin: Warm and dry, without lesions or rashes      Medication:    Medications:          Continuous Medications       --------------------------------  No continuous medications are active       Scheduled Medications       --------------------------------    1. Acetaminophen:  650  mg  Oral  Every 6 Hours    2. Aspirin Enteric Coated:  81  mg  Oral  Daily    3. Heparin SubCutaneous:  5000  unit(s)  SubCutaneous  Every 8 Hours    4. Lidocaine 2% Topical Gel:  1  application(s)  Topical  Once    5. Lisinopril:  2.5  mg  Oral  Daily    6. Piperacillin - Tazobactam 3.375 gram/Iso-osmotic 50 mL Premix IVPB:  50  mL  IntraVenous Piggyback  Every 8 Hours    7. Simvastatin:  10  mg  Oral  At Bedtime         PRN Medications       --------------------------------    1. HYDROmorphone Injectable:  0.2  mg  IntraVenous Push  Every 2 Hours    2. Ondansetron Injectable:  4  mg  IntraVenous Push  Every 6 Hours    3. oxyCODONE Immediate Release:  5  mg  Oral  Every 4 Hours    4. oxyCODONE Immediate Release:  10  mg  Oral  Every 4 Hours    5. Sore Throat Lozenge:  1  lozenge(s)  Oral  Every 2 Hours        Recent Lab Results:    Results:    CBC: 9/25/2023 07:49              \     Hgb     /                              \     11.7 L    /  WBC  ----------------  Plt               7.0       ----------------    194              /     Hct     \                              /     36.2       \            RBC: 3.83 L    MCV: 95           RFP: 9/25/2023 07:49  NA+        Cl-     BUN  /                         140    104    15  /  --------------------------------  Glucose                ---------------------------  87    K+     HCO3-   Creat \                         3.7    28    1.15 H \  Calcium : 8.6Anion Gap : 12          Albumin : 3.4     Phos : 2.9        I have reviewed these laboratory results:    Complete Blood Count  25-Sep-2023 07:49:00      Result Value     White Blood Cell Count  7.0    Nucleated Erythrocyte Count  0.0    Red Blood Cell Count  3.83   L   HGB  11.7   L   HCT  36.2    MCV  95    MCHC  32.3    PLT  194    RDW-CV  12.4      Renal Function Panel  25-Sep-2023 07:49:00      Result Value    Glucose, Serum  87    NA  140    K  3.7    CL  104    Bicarbonate, Serum  28    Anion Gap, Serum  12    BUN  15    CREAT  1.15   H   GFR Female  45   A   Calcium, Serum  8.6    Phosphorus, Serum  2.9    ALB  3.4      Magnesium, Serum  25-Sep-2023 07:49:00      Result Value    Magnesium, Serum  2.05        Radiology Results:    Results:        Impression:       Nasogastric tube tip gastric fundus.        Signed by Johnnie Olivera MD     Xray Abdomen AP View [Sep 22 2023  5:56AM]      Impression:    As mentioned in the body of the report there are multiple pulmonary  nodules in the bilateral lung bases measuring up to 6 mm in diameter.   Follow-up is recommended per Fleischner Society guidelines.        Fleischner Society 2017 Guidelines for Management of Incidentally  Detected Pulmonary Nodules in Adults:     A.  SOLID NODULES*  Nodule Typeand Size:  Single:  *Low Risk**   < 6 mm - No routine follow-up  6-8 mm - CT at 6-12 months, then consider CT at 18-24 months  > 8 mm - Consider CT at 3 months, PET/CT, or tissue sampling  *High Risk**  < 6 mm - Optional CT at 12 months  6-8 mm - CT at 6-12 months, then CT at 18-24 months  > 8 mm - Consider CT at 3 months, PET/CT, or tissue sampling  Multiple:  *Low Risk**   < 6 mm - No routine follow-up  6-8 mm - CT at 3-6 months, then consider CT at 18-24 months  > 8 mm - CT at 3-6 months, thenconsider CT at 18-24 months  *High Risk**  < 6 mm - Optional CT at 12 months  6-8 mm - CT at 3-6 months, then at 18-24 months  > 8 mm - CT at 3-6 months, then at 18-24 months     B. SUBSOLID NODULES*  Nodule Type and Size:  Single:    *Ground glass  < 6 mm - No routine follow-up  > 6 mm - CT at 6-12 months to confirm persistence, then CT every  2  years until 5 years  *Part Solid  < 6 mm - No routine follow-up  > 6 mm - CT at 3-6 months to confirm persistence.  If unchanged and  solid component remains < 6 mm, annual CT should be performed for 5  years.  Multiple:  < 6 mm - CT at 3-6 months.  If stable, consider CT at 2 and 4 years.  > 6 mm - CT at 3-6 months.  Subsequent management based on the most  suspicious nodule(s).     Note - These recommendations do not apply to lung cancer screening,  patients with immunosuppression, or patients with known primary  cancer.  * Dimensions are average of long and short axes, rounded to the  nearest millimeter.  ** Consider all relevant risk factors.        Signed by Kapil Reed  Addendum Ends  STUDY:  CT Abdomen and Pelvis without IV Contrast; 09/22/2023 3:51 pm     INDICATION:  Lower abdominal pain.  Nausea     COMPARISON:  CT A/P 12/22/2022;  MR Kidney 07/18/2023.     ACCESSION NUMBER(S):  [id number]     ORDERING CLINICIAN:  SHIRLEY JEAN BAPTISTE DO     TECHNIQUE:  CT of the abdomen and pelvis was performed.  Contiguous axial images  were obtained at 3 mm slice thickness through the abdomen and pelvis.   Coronal and sagittal reconstructions at 3 mm slice thickness were  performed. No intravenous contrast was administered.       Automated mA/kV exposure control was utilized and patient examination  was performed in strict accordance with principles of ALARA.     FINDINGS:  Please notethat the evaluation of vessels, lymph nodes and organs is  limited without intravenous contrast.      LOWER CHEST:  Cardiac size is normal with mild partially visualized coronary  atherosclerosis.  No pericardial effusion.  Atelectasis is seen at the  left lung base.  Multiple pulmonary nodules are seen in the bilateral  lung bases measuring up to 6 mm in diameter.  Calcified granuloma is  seen in the right lung base.  Mild calcified plaque is seen in the  descending thoracic aorta.       ABDOMEN:     LIVER:  No hepatomegaly.  Smooth  surface contour.  Normal attenuation.     BILE DUCTS:  No intrahepatic or extrahepatic biliary ductal dilatation.     GALLBLADDER:  Gallbladder is thick-walled and contracted with small locules of air  seen in the lumen of the gallbladder and question of noncalcified  stones.  Left hepatic pneumobilia is noted.  Simple fluid density  cysts are seen in the liver.       STOMACH:  Small sliding-type hiatal hernia is noted.       PANCREAS:  Mild pancreatic atrophy is noted.  No masses or ductal dilatation.     SPLEEN:  No splenomegaly or focal splenic lesion.     ADRENAL GLANDS:  No thickening or nodules.     KIDNEYS AND URETERS:  Kidneys are normal in size and location.  Mild to moderate renal  cortical thinning is seen bilaterally.  No renal or ureteral calculi.     PELVIS:     BLADDER:  No abnormalities identified.     REPRODUCTIVE ORGANS:  No acute uterine or adnexal pathology is identified.     BOWEL:  There is wall thickening and inflammation of thesecond portion of the  duodenum adjacent to the gallbladder fossa.  Wall thickening and  inflammation is also seen of the terminal ileum with suggestion of a  peripherally calcified calculus measuring 2.1 cm in diameter at the  ileocecal valve which may represent gallstone impacted at the  ileocecal valve.  Appendix is not definitively identified and may  extend into a small right inguinal hernia containing fluid.   Diverticulosis is seen in the colon.       VESSELS:  Moderate calcified atheromatous plaque is seen in the abdominal aorta  with mild calcified plaque in the iliac arteries.       PERITONEUM/RETROPERITONEUM/LYMPH NODES:  Moderate amount of simple free fluid is seen in the pelvis.  No  pneumoperitoneum.     No lymphadenopathy.     ABDOMINAL WALL:  No abnormalities identified.     SOFT TISSUES:   No abnormalities identified.     BONES:  There is a mild levoconvex curvature of the lumbar spine centered at  L4.  Mild to moderate disc disease is seen at L2-3,  L3-4, L4-5, and  L5-S1.  Generalized osseous demineralization is noted.  There is mild  to moderate joint space narrowing in both hips.  No acute fracture or  aggressive osseous lesion.     IMPRESSION:  Wall thickening and inflammation of the gallbladder, second portion of  the duodenum, and terminal ileum with suggestion of a peripherally  calcified stone at the ileocecal valve.  Findings suggest possibility  of developing gallstone ileus caused by erosion of the gallstone  through the gallbladder wall into the duodenum, and traveling through  the small bowel and obstructing the ileocecal valve.  No definite  bowel obstruction is appreciated at this time however developing  obstruction is suspected.        Signed by Kapil Reed     CT Abdomen and Pelvis without Contrast [Sep 22 2023  4:43AM]      Assessment and Plan:   Code Status:  ·  Code Status Full Code     Assessment:    Patient is a 89-year-old female with past medical history significant for CKD 3, HTN, DLD, GERD, gallstones, SARS-CoV-2 (8/23), NIDDM 2.  Patient presented to the  ED on 9/22/2023 complaining of abdominal pain for approximately 12 hours.  In the ED patient was found to have gallstone ileus which was suggested via the CT scan.  Patient was admitted for gallstone ileus  and is now s/p ex lap with enterotomy and removal  of gallstone with primary closure in 2 layers on 9/22/2023.    1.  Sepsis 2/2 Gallstone ileus with severe stool burden: patient presented with abdominal pain and was found to have suggested gallstone ileus  via CT abdomen pelvis with contrast.  -s/p exploratory laparotomy with enterotomy for gallstone removal (9/22/23)  -as per surgery advance diet to full liquid diet  -Continue Zosyn 3.375 g IV every 8 hours  -Continue  Zofran 4 mg every 6 hours as needed for nausea  -Blood cultures continue to be negative  -Surgery is following continue to appreciate recommendations    2.  CKD stage III: Known Hx   -creatinine today  1.15  -Renally dose all medications  -Avoid nephrotoxic agents  -Continue to monitor renal function    3. HTN: Known Hx  -Continue lisinopril 2.5 mg p.o. daily  4. HLD: Known Hx  -Continue simvastatin 10 mg p.o. nightly  5.GERD: Known Hx  -Continue pantoprazole 40 mg p.o. daily    IVF: None at this time  Diet: Full liquid diet  DVT prophylaxis: Heparin subcu 5000 units 3 times daily  Dispo: anticipate discharge in the next 1 to 3 days pending surgery clearance    CODE STATUS: Full code    Assessment and plan discussed with senior resident and attending.    Julio Mayfield M.D.  Internal Medicine PGY-1  Available on [employer] [facility]      Attestation:   Note Completion:  I am a:  Resident/Fellow   Attending Attestation I saw and evaluated the patient.  I personally obtained the key and critical portions of the history and physical exam or was physically present for key and  critical portions performed by the resident/fellow. I reviewed the resident/fellow?s documentation and discussed the patient with the resident/fellow.  I agree with the resident/fellow?s medical decision making as documented in the note.     I personally evaluated the patient on 25-Sep-2023   Comments/ Additional Findings    Patient seen and examined, I agree with above documentation from resident physician. No acute events overnight. Sue is doing well. She is status  post ex lap, enterotomy with extraction of obstructing gallstone, primary repair, postop day #3 for gallstone ileus.  Her diet was advanced from clear liquids to full liquids today.  She is still on IV Zosyn, plan for 7-day course of antibiotics.  She  is passing gas and had 1 small bowel movement. She denies chest pain, shortness of breath, nausea, vomiting or diarrhea. She has minimal postoperative abdominal pain. She is encouraged to ambulate and use incentive spirometry, she is very motivated to  move and walk around the unit. Discussed with nursing staff if they can help her walk more  often.  Appreciate general surgery's input, will continue supportive care and follow patient closely.          Electronic Signatures:  Chio Gregory ()  (Signed 25-Sep-2023 14:52)   Authored: Note Completion   Co-Signer: Service, Subjective Data, Objective Data, Assessment and Plan, Note Completion  Julio Mayfield (Resident))  (Signed 25-Sep-2023 14:41)   Authored: Service, Subjective Data, Objective Data, Assessment  and Plan, Note Completion      Last Updated: 25-Sep-2023 14:52 by Chio Gregory ()

## 2023-10-06 LAB
ATRIAL RATE: 98 BPM
P AXIS: 63 DEGREES
P OFFSET: 185 MS
P ONSET: 139 MS
PR INTERVAL: 154 MS
Q ONSET: 216 MS
QRS COUNT: 16 BEATS
QRS DURATION: 114 MS
QT INTERVAL: 364 MS
QTC CALCULATION(BAZETT): 464 MS
QTC FREDERICIA: 428 MS
R AXIS: 82 DEGREES
T AXIS: -68 DEGREES
T OFFSET: 398 MS
VENTRICULAR RATE: 98 BPM

## 2023-10-06 NOTE — PROGRESS NOTES
Subjective   Patient ID: Sue Segura is a 89 y.o. female who presents for a post operative visit.    AICHA Rogers is a 89 yo female with past medical history significant for CAD, HTN, DLD, GERD, gallstones, SARS-CoV-2 (08/23), NIDDM 2, CKD stage III. She presented to the ED on 9/22/2023 complaining of abdominal pain and constipation x5 days. CT a/p demonstrated wall thickening and inflammation of the gallbladder, second portion of duodenum, and terminal ileum with suggestion of peripherally calcified stone at the ileocecal valve findings suggest possibility of developing gallstone ileus caused by erosion of the gallstones the gallbladder wall to the duodenum, entrapping the small bowel and obstructing ileocecal valve.  She is s/p ex-lap with enterotomy for removal of gallstone followed by primary closure in two layers on 9/22/23.     She is hoping to go back to work next week. No abdominal pain. She has still been on a soft diet. No nausea or vomiting. Moving her bowels once daily. Stools are hard. No diarrhea. No hematochezia. No dysuria or hematuria.  No fevers or chills.  No incisional complaints.    Review of Systems  As above    Objective   Physical Exam  General:  A&Ox3, NAD.  Neuro:  No focal deficits.  Respiratory:  No respiratory distress.  Breathing comfortably.  CV:  Extremities warm and well-perfused, no CARLOS.  GI:  Soft, non-distended, non-TTP, incision(s) C/D/I w/ staples which are removed today in office.    :  Arroyo catheter absent      Assessment/Plan   #Gallstone ileus S/P exploratory laparotomy with enterotomy and removal of impacted/obstructing stone  -  Staples removed today in office  -  OK to begin liberalizing diet  -  No strenuous activity or heavy lifting >10-15lbs until 6 weeks post-op  -  Follow-up as needed

## 2023-10-07 DIAGNOSIS — E78.49 OTHER HYPERLIPIDEMIA: ICD-10-CM

## 2023-10-07 DIAGNOSIS — R03.0 ELEVATED BLOOD PRESSURE READING: Primary | ICD-10-CM

## 2023-10-09 PROBLEM — E11.9 DIABETES MELLITUS (MULTI): Status: ACTIVE | Noted: 2023-10-09

## 2023-10-09 PROBLEM — Z98.61 HISTORY OF PTCA: Status: ACTIVE | Noted: 2023-10-09

## 2023-10-09 PROBLEM — N28.1 CYST OF RIGHT KIDNEY: Status: ACTIVE | Noted: 2023-10-09

## 2023-10-09 PROBLEM — D64.9 ANEMIA: Status: ACTIVE | Noted: 2023-10-09

## 2023-10-09 PROBLEM — K21.9 GASTROESOPHAGEAL REFLUX DISEASE: Status: ACTIVE | Noted: 2023-10-09

## 2023-10-09 PROBLEM — N18.30 CKD (CHRONIC KIDNEY DISEASE), STAGE III (MULTI): Status: ACTIVE | Noted: 2023-10-09

## 2023-10-09 PROBLEM — Z78.9 NEVER SMOKED ANY SUBSTANCE: Status: ACTIVE | Noted: 2023-10-09

## 2023-10-09 PROBLEM — D72.829 LEUKOCYTOSIS: Status: ACTIVE | Noted: 2023-10-09

## 2023-10-09 PROBLEM — E78.5 HYPERLIPIDEMIA: Status: ACTIVE | Noted: 2023-10-09

## 2023-10-09 PROBLEM — B35.1 ONYCHOMYCOSIS: Status: ACTIVE | Noted: 2023-10-09

## 2023-10-09 PROBLEM — E83.19 IRON EXCESS: Status: ACTIVE | Noted: 2023-10-09

## 2023-10-09 PROBLEM — R63.0 LOSS OF APPETITE: Status: ACTIVE | Noted: 2023-10-09

## 2023-10-09 PROBLEM — R05.3 CHRONIC COUGH: Status: ACTIVE | Noted: 2023-10-09

## 2023-10-09 PROBLEM — R79.89 ABNORMAL TSH: Status: ACTIVE | Noted: 2023-10-09

## 2023-10-09 PROBLEM — R74.8 ELEVATED LIVER ENZYMES: Status: ACTIVE | Noted: 2023-10-09

## 2023-10-09 PROBLEM — N28.1 RENAL CYST: Status: ACTIVE | Noted: 2023-10-09

## 2023-10-09 PROBLEM — R03.0 ELEVATED BLOOD PRESSURE READING: Status: ACTIVE | Noted: 2023-10-09

## 2023-10-09 PROBLEM — R91.8 LUNG NODULES: Status: ACTIVE | Noted: 2023-10-09

## 2023-10-09 RX ORDER — LISINOPRIL 2.5 MG/1
2.5 TABLET ORAL DAILY
Qty: 1 TABLET | Refills: 0 | Status: SHIPPED | OUTPATIENT
Start: 2023-10-09

## 2023-10-09 RX ORDER — LANCETS
EACH MISCELLANEOUS 2 TIMES DAILY
COMMUNITY
Start: 2021-11-04

## 2023-10-09 RX ORDER — ALBUTEROL SULFATE 90 UG/1
1 AEROSOL, METERED RESPIRATORY (INHALATION) EVERY 8 HOURS PRN
COMMUNITY
Start: 2023-09-12 | End: 2024-05-02 | Stop reason: ALTCHOICE

## 2023-10-09 RX ORDER — OMEPRAZOLE 20 MG/1
20 CAPSULE, DELAYED RELEASE ORAL DAILY
COMMUNITY
End: 2023-10-30

## 2023-10-09 RX ORDER — DOXYCYCLINE 100 MG/1
1 CAPSULE ORAL EVERY 12 HOURS
COMMUNITY
Start: 2023-09-12 | End: 2023-10-17 | Stop reason: ALTCHOICE

## 2023-10-09 RX ORDER — MULTIVITAMIN
TABLET ORAL DAILY
COMMUNITY

## 2023-10-09 RX ORDER — BLOOD SUGAR DIAGNOSTIC
1 STRIP MISCELLANEOUS 2 TIMES DAILY
COMMUNITY

## 2023-10-09 RX ORDER — LANOLIN ALCOHOL/MO/W.PET/CERES
CREAM (GRAM) TOPICAL
COMMUNITY
End: 2024-05-02 | Stop reason: ALTCHOICE

## 2023-10-09 RX ORDER — SIMVASTATIN 10 MG/1
10 TABLET, FILM COATED ORAL DAILY
Qty: 1 TABLET | Refills: 0 | Status: SHIPPED | OUTPATIENT
Start: 2023-10-09 | End: 2023-10-17 | Stop reason: ALTCHOICE

## 2023-10-09 RX ORDER — DEXTROMETHORPHAN HYDROBROMIDE, GUAIFENESIN 5; 100 MG/5ML; MG/5ML
1 LIQUID ORAL 4 TIMES DAILY
COMMUNITY
Start: 2021-10-08

## 2023-10-09 RX ORDER — LISINOPRIL 2.5 MG/1
1 TABLET ORAL DAILY
COMMUNITY
End: 2023-10-13 | Stop reason: SDUPTHER

## 2023-10-09 RX ORDER — SIMVASTATIN 10 MG/1
1 TABLET, FILM COATED ORAL DAILY
COMMUNITY
End: 2023-10-13 | Stop reason: SDUPTHER

## 2023-10-09 RX ORDER — PANTOPRAZOLE SODIUM 40 MG/1
1 TABLET, DELAYED RELEASE ORAL DAILY
COMMUNITY
Start: 2022-12-29 | End: 2023-10-17 | Stop reason: ALTCHOICE

## 2023-10-10 ENCOUNTER — OFFICE VISIT (OUTPATIENT)
Dept: SURGERY | Facility: CLINIC | Age: 88
End: 2023-10-10
Payer: MEDICARE

## 2023-10-10 VITALS — HEART RATE: 91 BPM | DIASTOLIC BLOOD PRESSURE: 72 MMHG | SYSTOLIC BLOOD PRESSURE: 124 MMHG

## 2023-10-10 DIAGNOSIS — K56.3 GALLSTONE ILEUS (MULTI): Primary | ICD-10-CM

## 2023-10-10 PROCEDURE — 1159F MED LIST DOCD IN RCRD: CPT | Performed by: STUDENT IN AN ORGANIZED HEALTH CARE EDUCATION/TRAINING PROGRAM

## 2023-10-10 PROCEDURE — 1111F DSCHRG MED/CURRENT MED MERGE: CPT | Performed by: STUDENT IN AN ORGANIZED HEALTH CARE EDUCATION/TRAINING PROGRAM

## 2023-10-10 PROCEDURE — 99024 POSTOP FOLLOW-UP VISIT: CPT | Performed by: STUDENT IN AN ORGANIZED HEALTH CARE EDUCATION/TRAINING PROGRAM

## 2023-10-11 ENCOUNTER — PATIENT OUTREACH (OUTPATIENT)
Dept: CARE COORDINATION | Facility: CLINIC | Age: 88
End: 2023-10-11
Payer: MEDICARE

## 2023-10-11 ENCOUNTER — TELEPHONE (OUTPATIENT)
Dept: PRIMARY CARE | Facility: CLINIC | Age: 88
End: 2023-10-11
Payer: MEDICARE

## 2023-10-11 DIAGNOSIS — E78.49 OTHER HYPERLIPIDEMIA: ICD-10-CM

## 2023-10-11 DIAGNOSIS — R03.0 ELEVATED BLOOD PRESSURE READING: Primary | ICD-10-CM

## 2023-10-11 NOTE — PROGRESS NOTES
Unable to reach patient for call back after patient's follow up appointment with PCP.  LVM  with call back number for patient to call if needed.

## 2023-10-11 NOTE — TELEPHONE ENCOUNTER
Pharmacy left voicemail wanting to verify the quantity of Lisinopril and Simvastatin.  States Rx only has once daily but no quantity and no refills were stated.  Please advise.

## 2023-10-13 RX ORDER — LISINOPRIL 2.5 MG/1
2.5 TABLET ORAL DAILY
Qty: 90 TABLET | Refills: 1 | Status: SHIPPED | OUTPATIENT
Start: 2023-10-13 | End: 2024-04-24

## 2023-10-13 RX ORDER — SIMVASTATIN 10 MG/1
10 TABLET, FILM COATED ORAL DAILY
Qty: 90 TABLET | Refills: 1 | Status: SHIPPED | OUTPATIENT
Start: 2023-10-13 | End: 2024-04-24

## 2023-10-13 NOTE — TELEPHONE ENCOUNTER
Patient called and said [facility] told her the script was only for one tablet and they are normally for 100 tablets.  She is almost out of the Simvastation. She would like a refill of both her simvastation and her lisinopril.

## 2023-10-17 ENCOUNTER — APPOINTMENT (OUTPATIENT)
Dept: PRIMARY CARE | Facility: CLINIC | Age: 88
End: 2023-10-17
Payer: MEDICARE

## 2023-10-17 ENCOUNTER — OFFICE VISIT (OUTPATIENT)
Dept: PRIMARY CARE | Facility: CLINIC | Age: 88
End: 2023-10-17
Payer: MEDICARE

## 2023-10-17 VITALS
HEART RATE: 85 BPM | SYSTOLIC BLOOD PRESSURE: 114 MMHG | HEIGHT: 62 IN | BODY MASS INDEX: 19.95 KG/M2 | WEIGHT: 108.4 LBS | TEMPERATURE: 97.4 F | DIASTOLIC BLOOD PRESSURE: 56 MMHG

## 2023-10-17 DIAGNOSIS — K56.3 GALLSTONE ILEUS (MULTI): ICD-10-CM

## 2023-10-17 PROCEDURE — 99213 OFFICE O/P EST LOW 20 MIN: CPT | Performed by: FAMILY MEDICINE

## 2023-10-17 PROCEDURE — 1159F MED LIST DOCD IN RCRD: CPT | Performed by: FAMILY MEDICINE

## 2023-10-17 PROCEDURE — 1111F DSCHRG MED/CURRENT MED MERGE: CPT | Performed by: FAMILY MEDICINE

## 2023-10-17 PROCEDURE — 1036F TOBACCO NON-USER: CPT | Performed by: FAMILY MEDICINE

## 2023-10-17 NOTE — PROGRESS NOTES
Subjective     Patient ID: Sue Segura is a 89 y.o. female who presents for Hospital Follow-up (Gallstone in intestine d/c'd 9/28/20223):    Problem List Items Addressed This Visit    None     Past Medical History:   Diagnosis Date    Acute cystitis without hematuria     Acute cystitis without hematuria    Body mass index (BMI) 19.9 or less, adult 06/21/2022    Body mass index (BMI) of 19.9 or less in adult    Body mass index (BMI) 20.0-20.9, adult 01/19/2022    Body mass index (BMI) of 20.0 to 20.9 in adult    Body mass index (BMI) 20.0-20.9, adult 11/02/2021    BMI 20.0-20.9, adult    Body mass index (BMI) 21.0-21.9, adult 11/16/2021    Body mass index (BMI) of 21.0 to 21.9 in adult    Body mass index (BMI) 22.0-22.9, adult 11/04/2021    Body mass index (BMI) of 22.0 to 22.9 in adult    Encounter for issue of repeat prescription 06/21/2022    Encounter for medication refill    Gastro-esophageal reflux disease with esophagitis, without bleeding     Reflux esophagitis    Impacted cerumen, bilateral     Bilateral impacted cerumen    Myalgia, other site     Left buttock pain    Pain in left hip     Hip pain, left    Person consulting for explanation of examination or test findings 11/04/2021    Encounter to discuss test results    Personal history of other diseases of the digestive system 10/11/2021    History of gastroesophageal reflux (GERD)    Personal history of other diseases of the respiratory system     History of bronchitis    Personal history of other diseases of the respiratory system     History of upper respiratory infection    Personal history of other drug therapy 01/19/2022    History of drug therapy    Personal history of other infectious and parasitic diseases 10/08/2021    History of onychomycosis    Personal history of other specified conditions 06/21/2022    History of chronic cough    Personal history of urinary (tract) infections     History of urinary tract infection    Underweight      Underweight    Unspecified disturbances of smell and taste     Smell disturbance    Unspecified otitis externa, unspecified ear     Otitis externa      Past Surgical History:   Procedure Laterality Date    OTHER SURGICAL HISTORY  10/08/2021    Arm surgery    OTHER SURGICAL HISTORY  10/08/2021    Lumpectomy    OTHER SURGICAL HISTORY  10/11/2021    Cardiac catheterization with stent placement    OTHER SURGICAL HISTORY  11/02/2021    Percutaneous transluminal coronary angioplasty    OTHER SURGICAL HISTORY  11/02/2021    Complete colonoscopy      Family History   Problem Relation Name Age of Onset    Other (cardiac disorder) Mother      Colon cancer Father        Social History     Socioeconomic History    Marital status:      Spouse name: Not on file    Number of children: Not on file    Years of education: Not on file    Highest education level: Not on file   Occupational History    Not on file   Tobacco Use    Smoking status: Never    Smokeless tobacco: Never   Substance and Sexual Activity    Alcohol use: Never    Drug use: Never    Sexual activity: Not on file   Other Topics Concern    Not on file   Social History Narrative    Not on file     Social Determinants of Health     Financial Resource Strain: Not on file   Food Insecurity: Not on file   Transportation Needs: Not on file   Physical Activity: Not on file   Stress: Not on file   Social Connections: Not on file   Intimate Partner Violence: Not on file   Housing Stability: Not on file      Glimepiride and Metformin   Current Outpatient Medications   Medication Sig Dispense Refill    acetaminophen (Tylenol 8 HOUR) 650 mg ER tablet Take 1 tablet (650 mg) by mouth 4 times a day. As needed      albuterol 90 mcg/actuation inhaler Inhale 1 puff every 8 hours if needed.      cyanocobalamin (Vitamin B-12) 1,000 mcg tablet Take by mouth.      lancets misc 2 times a day.  OneTouch Club Lancets Fine Pt  ONCE IN THE MORNING WHILE FASTING. ONCE 2 HOURS AFTER LUNCH  OR DINNER      lisinopril 2.5 mg tablet Take 1 tablet by mouth once daily 1 tablet 0    multivitamin tablet Take by mouth once daily.      omeprazole (PriLOSEC) 20 mg DR capsule Take 1 capsule (20 mg) by mouth once daily.      OneTouch Ultra Test strip 1 strip 2 times a day.      simvastatin (Zocor) 10 mg tablet Take 1 tablet (10 mg) by mouth once daily. 90 tablet 1    lisinopril 2.5 mg tablet Take 1 tablet (2.5 mg) by mouth once daily. (Patient not taking: Reported on 10/17/2023) 90 tablet 1    simvastatin (Zocor) 10 mg tablet Take 1 tablet by mouth once daily 1 tablet 0     No current facility-administered medications for this visit.       Immunization History   Administered Date(s) Administered    Influenza, Unspecified 11/16/2006, 10/18/2007, 10/16/2008, 11/03/2011, 09/13/2013, 10/23/2014, 10/30/2015, 10/23/2017, 10/12/2018, 10/02/2019, 10/09/2020    Influenza, seasonal, injectable 09/19/2016    Moderna SARS-CoV-2 Vaccination 03/09/2021, 04/06/2021    Pfizer Purple Cap SARS-CoV-2 01/05/2021, 01/26/2021    Pneumococcal conjugate vaccine, 13-valent (PREVNAR 13) 04/06/2017    Pneumococcal polysaccharide vaccine, 23-valent, age 2 years and older (PNEUMOVAX 23) 11/16/2018, 08/13/2019    Pneumococcal, Unspecified 05/31/2018    Tdap vaccine, age 7 year and older (BOOSTRIX) 08/08/2011    Zoster vaccine, recombinant, adult (SHINGRIX) 12/02/2020, 03/21/2021        Review of Systems     Vitals:    10/17/23 1634   BP: 114/56   Pulse: 85   Temp: 36.3 °C (97.4 °F)     Vitals:    10/17/23 1634   Weight: 49.2 kg (108 lb 6.4 oz)      Physical Exam     ASSESSMENT/PLAN:         Scribe Attestation  By signing my name below, I, Екатерина Olmos LPN   , Scribe   attest that this documentation has been prepared under the direction and in the presence of Gilson Aguillon MD.

## 2023-10-19 ASSESSMENT — ENCOUNTER SYMPTOMS
RESPIRATORY NEGATIVE: 1
NEUROLOGICAL NEGATIVE: 1
EYES NEGATIVE: 1
ENDOCRINE NEGATIVE: 1
ALLERGIC/IMMUNOLOGIC NEGATIVE: 1
PSYCHIATRIC NEGATIVE: 1
MUSCULOSKELETAL NEGATIVE: 1
CONSTITUTIONAL NEGATIVE: 1
HEMATOLOGIC/LYMPHATIC NEGATIVE: 1
CARDIOVASCULAR NEGATIVE: 1
GASTROINTESTINAL NEGATIVE: 1

## 2023-10-19 NOTE — PROGRESS NOTES
Subjective patient is here for posthospital follow-up for gallstone ileus.  [place] [facility] [place] significant abdominal pain and constipation.  Evaluation revealed inflammation of the gallbladder with a calcified stone at the ileocecal valve.  There was significant inflammation also in the small bowel.  The patient had exploratory laparotomy done with surgical repair of the above.  She did well postoperatively without any complications.  She has already followed up with general surgery.  At this point she feels well without any abdominal pain nausea vomiting or constipation.  She continues on her meds noted.    Patient ID: Sue Segura is a 89 y.o. female who presents for Hospital Follow-up (Gallstone in intestine d/c'd 9/28/20223):    Problem List Items Addressed This Visit    None  Visit Diagnoses       Gallstone ileus (CMS/[facility])               Past Medical History:   Diagnosis Date    Acute cystitis without hematuria     Acute cystitis without hematuria    Body mass index (BMI) 19.9 or less, adult 06/21/2022    Body mass index (BMI) of 19.9 or less in adult    Body mass index (BMI) 20.0-20.9, adult 01/19/2022    Body mass index (BMI) of 20.0 to 20.9 in adult    Body mass index (BMI) 20.0-20.9, adult 11/02/2021    BMI 20.0-20.9, adult    Body mass index (BMI) 21.0-21.9, adult 11/16/2021    Body mass index (BMI) of 21.0 to 21.9 in adult    Body mass index (BMI) 22.0-22.9, adult 11/04/2021    Body mass index (BMI) of 22.0 to 22.9 in adult    Encounter for issue of repeat prescription 06/21/2022    Encounter for medication refill    Gastro-esophageal reflux disease with esophagitis, without bleeding     Reflux esophagitis    Impacted cerumen, bilateral     Bilateral impacted cerumen    Myalgia, other site     Left buttock pain    Pain in left hip     Hip pain, left    Person consulting for explanation of examination or test findings 11/04/2021    Encounter to  discuss test results    Personal history of other diseases of the digestive system 10/11/2021    History of gastroesophageal reflux (GERD)    Personal history of other diseases of the respiratory system     History of bronchitis    Personal history of other diseases of the respiratory system     History of upper respiratory infection    Personal history of other drug therapy 01/19/2022    History of drug therapy    Personal history of other infectious and parasitic diseases 10/08/2021    History of onychomycosis    Personal history of other specified conditions 06/21/2022    History of chronic cough    Personal history of urinary (tract) infections     History of urinary tract infection    Underweight     Underweight    Unspecified disturbances of smell and taste     Smell disturbance    Unspecified otitis externa, unspecified ear     Otitis externa      Past Surgical History:   Procedure Laterality Date    OTHER SURGICAL HISTORY  10/08/2021    Arm surgery    OTHER SURGICAL HISTORY  10/08/2021    Lumpectomy    OTHER SURGICAL HISTORY  10/11/2021    Cardiac catheterization with stent placement    OTHER SURGICAL HISTORY  11/02/2021    Percutaneous transluminal coronary angioplasty    OTHER SURGICAL HISTORY  11/02/2021    Complete colonoscopy      Family History   Problem Relation Name Age of Onset    Other (cardiac disorder) Mother      Colon cancer Father        Social History     Socioeconomic History    Marital status:      Spouse name: Not on file    Number of children: Not on file    Years of education: Not on file    Highest education level: Not on file   Occupational History    Not on file   Tobacco Use    Smoking status: Never    Smokeless tobacco: Never   Substance and Sexual Activity    Alcohol use: Never    Drug use: Never    Sexual activity: Not on file   Other Topics Concern    Not on file   Social History Narrative    Not on file     Social Determinants of Health     Financial Resource Strain: Not  on file   Food Insecurity: Not on file   Transportation Needs: Not on file   Physical Activity: Not on file   Stress: Not on file   Social Connections: Not on file   Intimate Partner Violence: Not on file   Housing Stability: Not on file      Glimepiride and Metformin   Current Outpatient Medications   Medication Sig Dispense Refill    acetaminophen (Tylenol 8 HOUR) 650 mg ER tablet Take 1 tablet (650 mg) by mouth 4 times a day. As needed      albuterol 90 mcg/actuation inhaler Inhale 1 puff every 8 hours if needed.      cyanocobalamin (Vitamin B-12) 1,000 mcg tablet Take by mouth.      lancets misc 2 times a day.  OneTouch Club Lancets Fine Pt  ONCE IN THE MORNING WHILE FASTING. ONCE 2 HOURS AFTER LUNCH OR DINNER      lisinopril 2.5 mg tablet Take 1 tablet by mouth once daily 1 tablet 0    multivitamin tablet Take by mouth once daily.      omeprazole (PriLOSEC) 20 mg DR capsule Take 1 capsule (20 mg) by mouth once daily.      OneTouch Ultra Test strip 1 strip 2 times a day.      simvastatin (Zocor) 10 mg tablet Take 1 tablet (10 mg) by mouth once daily. 90 tablet 1    lisinopril 2.5 mg tablet Take 1 tablet (2.5 mg) by mouth once daily. (Patient not taking: Reported on 10/17/2023) 90 tablet 1     No current facility-administered medications for this visit.       Immunization History   Administered Date(s) Administered    Influenza, Unspecified 11/16/2006, 10/18/2007, 10/16/2008, 11/03/2011, 09/13/2013, 10/23/2014, 10/30/2015, 10/23/2017, 10/12/2018, 10/02/2019, 10/09/2020    Influenza, seasonal, injectable 09/19/2016    Moderna SARS-CoV-2 Vaccination 03/09/2021, 04/06/2021    Pfizer Purple Cap SARS-CoV-2 01/05/2021, 01/26/2021    Pneumococcal conjugate vaccine, 13-valent (PREVNAR 13) 04/06/2017    Pneumococcal polysaccharide vaccine, 23-valent, age 2 years and older (PNEUMOVAX 23) 11/16/2018, 08/13/2019    Pneumococcal, Unspecified 05/31/2018    Tdap vaccine, age 7 year and older (BOOSTRIX) 08/08/2011    Zoster  vaccine, recombinant, adult (SHINGRIX) 12/02/2020, 03/21/2021        Review of Systems   Constitutional: Negative.    HENT: Negative.     Eyes: Negative.    Respiratory: Negative.     Cardiovascular: Negative.    Gastrointestinal: Negative.    Endocrine: Negative.    Genitourinary: Negative.    Musculoskeletal: Negative.    Skin: Negative.    Allergic/Immunologic: Negative.    Neurological: Negative.    Hematological: Negative.    Psychiatric/Behavioral: Negative.     All other systems reviewed and are negative.       Vitals:    10/17/23 1634   BP: 114/56   Pulse: 85   Temp: 36.3 °C (97.4 °F)     Vitals:    10/17/23 1634   Weight: 49.2 kg (108 lb 6.4 oz)      Physical Exam  Constitutional:       General: She is not in acute distress.     Appearance: Normal appearance. She is not ill-appearing.   Cardiovascular:      Rate and Rhythm: Normal rate and regular rhythm.      Pulses: Normal pulses.      Heart sounds: Normal heart sounds.   Pulmonary:      Effort: Pulmonary effort is normal.      Breath sounds: Normal breath sounds.   Abdominal:      General: Abdomen is flat.      Palpations: Abdomen is soft. There is no mass.      Tenderness: There is no abdominal tenderness. There is no guarding.   Neurological:      Mental Status: She is alert.     There is a well-healed midline abdominal surgical scar without any evidence of redness swelling or discharge.    ASSESSMENT/PLAN: Status post exploratory laparotomy for gallstone ileus  Abdominal pain resolved    Plan-follow-up with general surgery as scheduled.  Continue current medications  Avoid heavy lifting and rest as needed  Follow-up as scheduled and call as needed

## 2023-10-27 ENCOUNTER — PATIENT OUTREACH (OUTPATIENT)
Dept: CARE COORDINATION | Facility: CLINIC | Age: 88
End: 2023-10-27
Payer: MEDICARE

## 2023-10-28 DIAGNOSIS — K21.9 GASTROESOPHAGEAL REFLUX DISEASE WITHOUT ESOPHAGITIS: Primary | ICD-10-CM

## 2023-10-30 RX ORDER — OMEPRAZOLE 20 MG/1
40 CAPSULE, DELAYED RELEASE ORAL DAILY
Qty: 180 CAPSULE | Refills: 0 | Status: SHIPPED | OUTPATIENT
Start: 2023-10-30 | End: 2024-05-02

## 2023-12-28 ENCOUNTER — PATIENT OUTREACH (OUTPATIENT)
Dept: CARE COORDINATION | Facility: CLINIC | Age: 88
End: 2023-12-28
Payer: MEDICARE

## 2023-12-28 NOTE — PROGRESS NOTES
Unable to reach patient for the 90 days post discharge follow up call.               LVM with call back number for patient to call if needed   Patient has met target of no readmission for 90 days post hospital discharge and is graduated from Transitional Care Management program at this time.

## 2024-03-06 NOTE — CONSULTS
Service:   Service: Surgery     Consult:  Consult requested by (Attending Name): Chio Gregory   Reason: Gallstone ileus     History of Present Illness:   HPI:    DIOGO SANTIZO is a 89 year old Female withho presented to [facility] for evaluation of acute onset abdominal pain.  Pain started yesterday evening  at approximately 2100 hrs.  Pain localized to bilateral lower quadrants.  Pain described as an intense constant pressure sensation rated 10 out of 10 at its worst.  Pain associated with nausea and emesis.  No identifiable precipitating factors.  Patient  attempted to treat with Tums and [place] without relief.  No identifiable alleviating or aggravating factors.  Patient denies fevers, chills, sweats.  Last flatus was earlier this a.m.  Last bowel movement was yesterday.  Last bowel movement described  as firm and brown.  Patient denies hematochezia, melena.  Patient denies dysuria or hematuria.  Patient has never had pain of this nature before.  She has known of previous cholelithiasis.    At time of presentation, patient noted to be afebrile with temperature of 36.3 °C.  Heart rate noted to be 97.  Blood pressure 156/72.  Pulse oximetry 98%.  CBC notable for mild leukocytosis to 12.1.  Comprehensive metabolic panel notable for hyperglycemia  with glucose of 200, BUN of 29 and serum creatinine of 1.26.  Liver function testing notable for elevation of ALT to 77 and AST to 160.  Alkaline phosphatase and total serum bilirubin otherwise within normal limits.  Serum lipase within normal limits.   Serum lactate level also within normal limits.  Further evaluation with CT of the abdomen and pelvis without contrast was performed.  Results listed below.  Significant for wall thickening and inflammation of the gallbladder along with the second portion  of the duodenum.  In the terminal ileum at the level of the ileocecal valve, there is a peripherally calcified stone.  Findings consistent  with gallstone ileus.  Surgery was consulted.  Patient was admitted to hospitalist service.  Nasogastric tube was  inserted in emergency department.    Past medical history: Hypertension, hyperlipidemia, coronary artery disease   Past surgical history: Cardiac stent placement 2002 currently on 81 mg aspirin daily, orthopedic surgery  Social history: Denies smoking, alcohol use, illicit substance use  Family history: Heart disease and diabetes    12 point ROS completed and negative except as noted above.    Review Family/Social History and ROS:   Social History:    Smoking Status: never smoker  (1)   Alcohol Use: denies (1)   Drug Use: denies  (1)            Allergies:  ·  No Known Allergies :     Objective:     Objective Information:        T   P  R  BP   MAP  SpO2   Value  37.1  110  20  131/58   84  97%  Date/Time 9/22 6:27 9/22 6:27 9/22 6:27 9/22 6:27 9/22 6:27 9/22 6:27  Range  (36.3C - 37.1C )  (97 - 114 )  (18 - 20 )  (131 - 156 )/ (58 - 72 )  (84 - 96 )  (93% - 98% )  Highest temp of 37.1 C was recorded at 9/22 6:27        Pain reported at 9/22 4:30: sleeping         Weights   9/22 6:29: Weight in kg (Weight (kg))  51  9/22 6:29: Weight in lbs ((lbs))  112.4  9/22 6:29: BMI (kg/m2) (BMI (kg/m2))  19.313    Physical Exam by System:    Constitutional: Well developed, awake/alert/oriented  x3, no distress, alert and cooperative   Eyes: EOMI, clear sclera   ENMT: mucous membranes moist, no apparent injury,  no lesions seen   Head/Neck: Neck supple, no apparent injury, no JVD,  trachea midline   Respiratory/Thorax: Patent airways, CTAB, normal  breath sounds with good chest expansion, thorax symmetric   Cardiovascular: Regular, rate and rhythm, no murmurs,  2+ equal pulses of the extremities, normal S 1and S 2   Gastrointestinal: Mildly distended, soft, mildly  TTP in RLQ, no rebound tenderness or guarding   Musculoskeletal: ROM intact, no joint swelling, normal  strength   Extremities: normal extremities, no  cyanosis edema,  contusions or wounds, no clubbing   Neurological: CNII-XII grossly intact, no focal deficits,  moves all extremities.   Skin: Warm and dry, no lesions, no rashes     Medications:    Medications:          Continuous Medications       --------------------------------    1. Dextrose 5% - NaCL 0.9% Infusion:  1000  mL  IntraVenous  <Continuous>         Scheduled Medications       --------------------------------    1. Iohexol (Omnipaque 350-Radiology Contrast):  71.1  mL  IntraVenous Push  Once    2. Lidocaine 2% Topical Gel:  1  application(s)  Topical  Once    3. Piperacillin - Tazobactam 3.375 gram/Iso-osmotic 50 mL Premix IVPB:  50  mL  IntraVenous Piggyback  Every 8 Hours         PRN Medications       --------------------------------    1. HYDROmorphone Injectable:  0.5  mg  IntraVenous Push  Every 4 Hours    2. HYDROmorphone Injectable:  1  mg  IntraVenous Push  Every 4 Hours    3. Sodium Chloride 0.9% Injectable Flush:  10  mL  IntraVenous Flush  Every 8 Hours and as Needed        Recent Lab Results:    Results:        I have reviewed these laboratory results:    Lactate, Level  22-Sep-2023 03:33:00      Result Value    Lactate, Level  1.2      Urinalysis, Microscopic  22-Sep-2023 02:50:00      Result Value    White Cells  5-20   A   Red Blood Cells  0-5    Epithelial Cells, Squamous  1      Urinalysis with Culture if Indicated  22-Sep-2023 02:50:00      Result Value    Color, Urine  YELLOW  Reference Range: STRAW,YELLOW    Appearance, Urine  CLEAR    Specific Gravity, Urine  1.018    pH, Urine  6.0    Protein, Urine  NEGATIVE    Glucose, Urine  NEGATIVE    Blood, Urine  NEGATIVE    Ketones, Urine  NEGATIVE    Bilirubin, Urine  NEGATIVE    Urobilinogen, Urine  2.0   H   Nitrite, Urine  NEGATIVE    Leukocyte Esterase, Urine  MODERATE(2+)   A     Complete Blood Count + Differential  22-Sep-2023 02:34:00      Result Value    White Blood Cell Count  12.1   H   Nucleated Erythrocyte Count  0.0    Red  Blood Cell Count  4.12    HGB  12.5    HCT  38.5    MCV  93    MCHC  32.5    PLT  256    RDW-CV  12.2    Neutrophil %  84.7    Immature Granulocytes %  0.5    Lymphocyte %  6.5    Monocyte %  6.7    Eosinophil %  1.4    Basophil %  0.2    Neutrophil Count  10.26   H   Lymphocyte Count  0.79   L   Monocyte Count  0.81   H   Eosinophil Count  0.17    Basophil Count  0.03      Comprehensive Metabolic Panel  22-Sep-2023 02:34:00      Result Value    Glucose, Serum  200   H   NA  135   L   K  4.3    CL  101    Bicarbonate, Serum  27    Anion Gap, Serum  11    BUN  29   H   CREAT  1.26   H   GFR Female  40   A   Calcium, Serum  9.1    ALB  3.9    ALKP  77    T Pro  6.5    T Bili  0.7    Alanine Aminotransferase, Serum  77   H   Aspartate Transaminase, Serum  160   H     Lipase, Serum  22-Sep-2023 02:34:00      Result Value    Lipase, Serum  67        Radiology Results:    Results:        Impression:       Nasogastric tube tip gastric fundus.        Signed by Johnnie Olivera MD     Xray Abdomen AP View [Sep 22 2023  5:56AM]      Impression:    As mentioned in the body of the report there are multiple pulmonary  nodules in the bilateral lung bases measuring up to 6 mm in diameter.   Follow-up is recommended per Fleischner Society guidelines.        Fleischner Society 2017 Guidelines for Management of Incidentally  Detected Pulmonary Nodules in Adults:     A.  SOLID NODULES*  Nodule Typeand Size:  Single:  *Low Risk**   < 6 mm - No routine follow-up  6-8 mm - CT at 6-12 months, then consider CT at 18-24 months  > 8 mm - Consider CT at 3 months, PET/CT, or tissue sampling  *High Risk**  < 6 mm - Optional CT at 12 months  6-8 mm - CT at 6-12 months, then CT at 18-24 months  > 8 mm - Consider CT at 3 months, PET/CT, or tissue sampling  Multiple:  *Low Risk**   < 6 mm - No routine follow-up  6-8 mm - CT at 3-6 months, then consider CT at 18-24 months  > 8 mm - CT at 3-6 months, thenconsider CT at 18-24 months  *High Risk**  < 6  mm - Optional CT at 12 months  6-8 mm - CT at 3-6 months, then at 18-24 months  > 8 mm - CT at 3-6 months, then at 18-24 months     B. SUBSOLID NODULES*  Nodule Type and Size:  Single:    *Ground glass  < 6 mm - No routine follow-up  > 6 mm - CT at 6-12 months to confirm persistence, then CT every 2  years until 5 years  *Part Solid  < 6 mm - No routine follow-up  > 6 mm - CT at 3-6 months to confirm persistence.  If unchanged and  solid component remains < 6 mm, annual CT should be performed for 5  years.  Multiple:  < 6 mm - CT at 3-6 months.  If stable, consider CT at 2 and 4 years.  > 6 mm - CT at 3-6 months.  Subsequent management based on the most  suspicious nodule(s).     Note - These recommendations do not apply to lung cancer screening,  patients with immunosuppression, or patients with known primary  cancer.  * Dimensions are average of long and short axes, rounded to the  nearest millimeter.  ** Consider all relevant risk factors.        Signed by Kapil Reed  Addendum Ends  STUDY:  CT Abdomen and Pelvis without IV Contrast; 09/22/2023 3:51 pm     INDICATION:  Lower abdominal pain.  Nausea     COMPARISON:  CT A/P 12/22/2022;  MR Kidney 07/18/2023.     ACCESSION NUMBER(S):  [id number]     ORDERING CLINICIAN:  SHIRLEY JEAN BAPTISTE DO     TECHNIQUE:  CT of the abdomen and pelvis was performed.  Contiguous axial images  were obtained at 3 mm slice thickness through the abdomen and pelvis.   Coronal and sagittal reconstructions at 3 mm slice thickness were  performed. No intravenous contrast was administered.       Automated mA/kV exposure control was utilized and patient examination  was performed in strict accordance with principles of ALARA.     FINDINGS:  Please notethat the evaluation of vessels, lymph nodes and organs is  limited without intravenous contrast.      LOWER CHEST:  Cardiac size is normal with mild partially visualized coronary  atherosclerosis.  No pericardial effusion.  Atelectasis is  seen at the  left lung base.  Multiple pulmonary nodules are seen in the bilateral  lung bases measuring up to 6 mm in diameter.  Calcified granuloma is  seen in the right lung base.  Mild calcified plaque is seen in the  descending thoracic aorta.       ABDOMEN:     LIVER:  No hepatomegaly.  Smooth surface contour.  Normal attenuation.     BILE DUCTS:  No intrahepatic or extrahepatic biliary ductal dilatation.     GALLBLADDER:  Gallbladder is thick-walled and contracted with small locules of air  seen in the lumen of the gallbladder and question of noncalcified  stones.  Left hepatic pneumobilia is noted.  Simple fluid density  cysts are seen in the liver.       STOMACH:  Small sliding-type hiatal hernia is noted.       PANCREAS:  Mild pancreatic atrophy is noted.  No masses or ductal dilatation.     SPLEEN:  No splenomegaly or focal splenic lesion.     ADRENAL GLANDS:  No thickening or nodules.     KIDNEYS AND URETERS:  Kidneys are normal in size and location.  Mild to moderate renal  cortical thinning is seen bilaterally.  No renal or ureteral calculi.     PELVIS:     BLADDER:  No abnormalities identified.     REPRODUCTIVE ORGANS:  No acute uterine or adnexal pathology is identified.     BOWEL:  There is wall thickening and inflammation of thesecond portion of the  duodenum adjacent to the gallbladder fossa.  Wall thickening and  inflammation is also seen of the terminal ileum with suggestion of a  peripherally calcified calculus measuring 2.1 cm in diameter at the  ileocecal valve which may represent gallstone impacted at the  ileocecal valve.  Appendix is not definitively identified and may  extend into a small right inguinal hernia containing fluid.   Diverticulosis is seen in the colon.       VESSELS:  Moderate calcified atheromatous plaque is seen in the abdominal aorta  with mild calcified plaque in the iliac arteries.       PERITONEUM/RETROPERITONEUM/LYMPH NODES:  Moderate amount of simple free fluid is  "seen in the pelvis.  No  pneumoperitoneum.     No lymphadenopathy.     ABDOMINAL WALL:  No abnormalities identified.     SOFT TISSUES:   No abnormalities identified.     BONES:  There is a mild levoconvex curvature of the lumbar spine centered at  L4.  Mild to moderate disc disease is seen at L2-3, L3-4, L4-5, and  L5-S1.  Generalized osseous demineralization is noted.  There is mild  to moderate joint space narrowing in both hips.  No acute fracture or  aggressive osseous lesion.     IMPRESSION:  Wall thickening and inflammation of the gallbladder, second portion of  the duodenum, and terminal ileum with suggestion of a peripherally  calcified stone at the ileocecal valve.  Findings suggest possibility  of developing gallstone ileus caused by erosion of the gallstone  through the gallbladder wall into the duodenum, and traveling through  the small bowel and obstructing the ileocecal valve.  No definite  bowel obstruction is appreciated at this time however developing  obstruction is suspected.        Signed by Kapil Reed     CT Abdomen and Pelvis without Contrast [Sep 22 2023  4:43AM]        Assessment:    #Gallstone ileus  #Cholecystitis    -  Continue NPO with NGT to LIWS  -  To OR today for exploratory laparotomy with bowel resection versus extraction of stone via enterotomy  -  R/B/A discussed with patient and bedside   -  Patient agrees and consents to proceed  -  OR time likely this afternoon/early evening    Consult Status:  Consult Status    (select all that apply): initial  consult complete, will follow   Consult Order ID: [id number]       Electronic Signatures:  Johnnie Lord)  (Signed 22-Sep-2023 07:05)   Authored: Service, History of Present Illness, Review  Family/Social History and ROS, Allergies, Objective, Assessment/Recommendations, Note Completion      Last Updated: 22-Sep-2023 07:05 by Johnnie Lord)    References:  1.  Data Referenced From \"Patient Profile - Adult v2\" 22-Sep-2023 " 06:29

## 2024-03-21 ENCOUNTER — APPOINTMENT (OUTPATIENT)
Dept: PRIMARY CARE | Facility: CLINIC | Age: 89
End: 2024-03-21
Payer: MEDICARE

## 2024-04-12 ENCOUNTER — LAB (OUTPATIENT)
Dept: LAB | Facility: LAB | Age: 89
End: 2024-04-12
Payer: MEDICARE

## 2024-04-12 ENCOUNTER — TELEPHONE (OUTPATIENT)
Dept: PRIMARY CARE | Facility: CLINIC | Age: 89
End: 2024-04-12

## 2024-04-12 DIAGNOSIS — E78.5 HYPERLIPIDEMIA, UNSPECIFIED HYPERLIPIDEMIA TYPE: Primary | ICD-10-CM

## 2024-04-12 DIAGNOSIS — N30.00 ACUTE CYSTITIS WITHOUT HEMATURIA: ICD-10-CM

## 2024-04-12 DIAGNOSIS — R03.0 ELEVATED BLOOD PRESSURE READING: ICD-10-CM

## 2024-04-12 DIAGNOSIS — D64.9 ANEMIA, UNSPECIFIED TYPE: ICD-10-CM

## 2024-04-12 NOTE — TELEPHONE ENCOUNTER
Pt called and is requesting BW before her next OV in May. Pt is also wondering if you could put a referral in for a urine sample she thinks she may have a UTI.

## 2024-04-19 NOTE — OP NOTE
Patient Name:  Sue Segura  Patient :  1933  Patient MRN:  [id number]    Date:  2023    Procedure:  1.  Exploratory laparotomy  2.  Enterotomy for removal of gallstone followed by primary closure in two layers    Preoperative Diagnosis:  1.  Gallstone ileus    Postoperative Diagnosis:  Same    Surgeon:  Johnnie Lord MD    Assistant:  SA Marylu    Anesthesia:  GETA    Operative Indication:  Patient is a 89-year-old female who presented to [facility] for evaluation of cute onset abdominal pain.  Evaluation in the emergency department included CT of the abdomen and pelvis without contrast which was notable  for findings consistent with gallstone ileus.  Patient was admitted, made n.p.o., nasogastric tube was inserted, and surgery was consulted.  Imaging was reviewed and patient was recommended exploratory laparotomy with possible extraction of impacted gallstone  versus bowel resection.  After discussion of the risks, benefits, and alternatives, the patient agreed and consented to proceed.    Operative Details:  Safety checklist was performed in preoperative area confirming correct patient and correct procedure.  Patient brought to the operating room.  Sequential compression devices were applied bilateral lower extremities.  Following induction  of general anesthesia, the patient was placed in supine position with bilateral arms abducted and gently fixed armboard.  Using sterile technique, a 16 [place] silicone Arroyo catheter was inserted.  The patient's abdomen was prepped with ChloraPrep solution  and the patient draped in the usual sterile fashion.  Timeout was performed, once again confirming correct patient and correct procedure.  Patient was due for redosing of intravenous Zosyn antibiotic and this was administered prior to skin incision.   A large vertical midline incision was then created and skin using #10 scalpel blade.  Using combination of blunt and electrocautery  dissection, the incision opening was carried down through the subcutaneous tissues to the level of the fascia which was  grasped and sharply divided between Kocher clamps using a #10 scalpel blade.  There were no injuries to the intra-abdominal viscera fascial entry site.  The fascial opening was then extended the full length of the skin incision.    Upon entering that abdominal cavity, there was evidence of murky ascites which was suctioned away.  The small bowel was eviscerated from the abdominal cavity.  The small bowel was run from the ileocecal valve to the ligament of Treitz.  In the region  of the jejunum, a large palpable impacted gallstone was identified.  The small bowel immediately proximal to the gallstone was noted to be distended and mildly thickened.  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  Close evaluation  of the small bowel demonstrated no evidence of ischemic or compromised bowel.  There was no evidence of perforation.  Evaluation  of the gallbladder  demonstrated that it  was densely adhesed /fused to the  gastric antrum.  There  was evidence  of chronic  cholecystitis.  Decision was made  not to pursue  cholecystectomy.  Decision was made to create an enterotomy to extract the stone.  A several centimeter longitudinal  enterotomy was created at the antimesenteric border of the small bowel.  The stone was extracted and measured to be approximately 3 cm in dimension.  The enterotomy was then closed in 2 layers.  The first and deepest layer utilized multiple simple interrupted  3-0 Vicryl stitches taking full-thickness bites of the bowel.  The second more superficial layer utilized multiple simple interrupted 3-0 Vicryl stitches placed in Lembert fashion taking serosal bites.  The small bowel was then fully reevaluated again  from the ileocecal valve to the ligament of Treitz.  There were no additional palpable stones within the small bowel.  The small bowel was  completely viable and there were no additional areas identified requiring surgical attention.  The colon was palpated  but was found to be filled with formed stool making identification of additional stones within the bowel difficult.  At this time, the abdominal cavity was irrigated with copious amounts of warm normal saline and suctioned dry.  Preliminary count was  performed and confirmed to be correct.  The midline fascia was then closed with 2 #1 looped PDS sutures beginning at each end meeting and tied in the middle.  Final count was performed and confirmed to be correct.  The skin was reapproximated with a skin  stapler device.  The abdominal cavity was cleansed and dried.  Dry dressings were applied over the abdominal incision site.  Signout was performed.   Patient was awakened and taken to recovery area in stable condition.    Estimated Blood Loss:  20cc    Specimen:  Gallstone    Drains:  None    Complications:  None     Electronic Signatures:  Johnnie Lord) (Signed on 22-Sep-2023 16:45)   Authored     Last Updated: 22-Sep-2023 20:53 by Johnnie Lord)

## 2024-04-23 DIAGNOSIS — E78.49 OTHER HYPERLIPIDEMIA: ICD-10-CM

## 2024-04-23 DIAGNOSIS — R03.0 ELEVATED BLOOD PRESSURE READING: ICD-10-CM

## 2024-04-24 RX ORDER — LISINOPRIL 2.5 MG/1
2.5 TABLET ORAL DAILY
Qty: 90 TABLET | Refills: 1 | Status: SHIPPED | OUTPATIENT
Start: 2024-04-24 | End: 2024-05-02 | Stop reason: WASHOUT

## 2024-04-24 RX ORDER — SIMVASTATIN 10 MG/1
10 TABLET, FILM COATED ORAL DAILY
Qty: 90 TABLET | Refills: 1 | Status: SHIPPED | OUTPATIENT
Start: 2024-04-24

## 2024-04-29 ENCOUNTER — LAB (OUTPATIENT)
Dept: LAB | Facility: LAB | Age: 89
End: 2024-04-29
Payer: MEDICARE

## 2024-04-29 ENCOUNTER — TELEPHONE (OUTPATIENT)
Dept: PRIMARY CARE | Facility: CLINIC | Age: 89
End: 2024-04-29

## 2024-04-29 DIAGNOSIS — R03.0 ELEVATED BLOOD PRESSURE READING: ICD-10-CM

## 2024-04-29 DIAGNOSIS — D64.9 ANEMIA, UNSPECIFIED TYPE: ICD-10-CM

## 2024-04-29 DIAGNOSIS — E78.5 HYPERLIPIDEMIA, UNSPECIFIED HYPERLIPIDEMIA TYPE: ICD-10-CM

## 2024-04-29 DIAGNOSIS — N30.00 ACUTE CYSTITIS WITHOUT HEMATURIA: ICD-10-CM

## 2024-04-29 LAB
ALBUMIN SERPL BCP-MCNC: 4.5 G/DL (ref 3.4–5)
ALP SERPL-CCNC: 41 U/L (ref 33–136)
ALT SERPL W P-5'-P-CCNC: 13 U/L (ref 7–45)
ANION GAP SERPL CALC-SCNC: 11 MMOL/L (ref 10–20)
APPEARANCE UR: ABNORMAL
AST SERPL W P-5'-P-CCNC: 20 U/L (ref 9–39)
BACTERIA #/AREA URNS AUTO: ABNORMAL /HPF
BILIRUB SERPL-MCNC: 0.7 MG/DL (ref 0–1.2)
BILIRUB UR STRIP.AUTO-MCNC: NEGATIVE MG/DL
BUN SERPL-MCNC: 17 MG/DL (ref 6–23)
CALCIUM SERPL-MCNC: 9.7 MG/DL (ref 8.6–10.3)
CHLORIDE SERPL-SCNC: 105 MMOL/L (ref 98–107)
CHOLEST SERPL-MCNC: 156 MG/DL (ref 0–199)
CHOLESTEROL/HDL RATIO: 2.9
CO2 SERPL-SCNC: 29 MMOL/L (ref 21–32)
COLOR UR: YELLOW
CREAT SERPL-MCNC: 1.4 MG/DL (ref 0.5–1.05)
EGFRCR SERPLBLD CKD-EPI 2021: 36 ML/MIN/1.73M*2
ERYTHROCYTE [DISTWIDTH] IN BLOOD BY AUTOMATED COUNT: 12.7 % (ref 11.5–14.5)
GLUCOSE SERPL-MCNC: 91 MG/DL (ref 74–99)
GLUCOSE UR STRIP.AUTO-MCNC: NEGATIVE MG/DL
HCT VFR BLD AUTO: 40 % (ref 36–46)
HDLC SERPL-MCNC: 54.1 MG/DL
HGB BLD-MCNC: 13 G/DL (ref 12–16)
KETONES UR STRIP.AUTO-MCNC: NEGATIVE MG/DL
LDLC SERPL CALC-MCNC: 85 MG/DL
LEUKOCYTE ESTERASE UR QL STRIP.AUTO: ABNORMAL
MCH RBC QN AUTO: 31 PG (ref 26–34)
MCHC RBC AUTO-ENTMCNC: 32.5 G/DL (ref 32–36)
MCV RBC AUTO: 96 FL (ref 80–100)
MUCOUS THREADS #/AREA URNS AUTO: ABNORMAL /LPF
NITRITE UR QL STRIP.AUTO: NEGATIVE
NON HDL CHOLESTEROL: 102 MG/DL (ref 0–149)
NRBC BLD-RTO: 0 /100 WBCS (ref 0–0)
PH UR STRIP.AUTO: 6 [PH]
PLATELET # BLD AUTO: 246 X10*3/UL (ref 150–450)
POTASSIUM SERPL-SCNC: 4.3 MMOL/L (ref 3.5–5.3)
PROT SERPL-MCNC: 7.3 G/DL (ref 6.4–8.2)
PROT UR STRIP.AUTO-MCNC: NEGATIVE MG/DL
RBC # BLD AUTO: 4.19 X10*6/UL (ref 4–5.2)
RBC # UR STRIP.AUTO: NEGATIVE /UL
RBC #/AREA URNS AUTO: ABNORMAL /HPF
SODIUM SERPL-SCNC: 141 MMOL/L (ref 136–145)
SP GR UR STRIP.AUTO: 1.01
SQUAMOUS #/AREA URNS AUTO: ABNORMAL /HPF
TRIGL SERPL-MCNC: 87 MG/DL (ref 0–149)
TSH SERPL-ACNC: 2.46 MIU/L (ref 0.44–3.98)
UROBILINOGEN UR STRIP.AUTO-MCNC: <2 MG/DL
VLDL: 17 MG/DL (ref 0–40)
WBC # BLD AUTO: 5.3 X10*3/UL (ref 4.4–11.3)
WBC #/AREA URNS AUTO: >50 /HPF
WBC CLUMPS #/AREA URNS AUTO: ABNORMAL /HPF

## 2024-04-29 PROCEDURE — 85027 COMPLETE CBC AUTOMATED: CPT

## 2024-04-29 PROCEDURE — 81001 URINALYSIS AUTO W/SCOPE: CPT

## 2024-04-29 PROCEDURE — 80053 COMPREHEN METABOLIC PANEL: CPT

## 2024-04-29 PROCEDURE — 84443 ASSAY THYROID STIM HORMONE: CPT

## 2024-04-29 PROCEDURE — 80061 LIPID PANEL: CPT

## 2024-04-29 PROCEDURE — 36415 COLL VENOUS BLD VENIPUNCTURE: CPT

## 2024-04-29 NOTE — TELEPHONE ENCOUNTER
----- Message from Gilson Aguillon MD sent at 4/29/2024  1:26 PM EDT -----  UA shows signs of UTI--ask pt if having symptoms

## 2024-04-29 NOTE — TELEPHONE ENCOUNTER
T/c to pt, spoke with pt, asked pt if she is currently having any UTI sxs, pt states that she has been using Cystex off and on and that seems to be helping, advised pt to give the office a call back if sxs return or worsen.

## 2024-05-02 ENCOUNTER — OFFICE VISIT (OUTPATIENT)
Dept: PRIMARY CARE | Facility: CLINIC | Age: 89
End: 2024-05-02
Payer: MEDICARE

## 2024-05-02 VITALS
HEART RATE: 84 BPM | WEIGHT: 117.4 LBS | TEMPERATURE: 97.3 F | BODY MASS INDEX: 21.6 KG/M2 | SYSTOLIC BLOOD PRESSURE: 146 MMHG | HEIGHT: 62 IN | DIASTOLIC BLOOD PRESSURE: 73 MMHG

## 2024-05-02 DIAGNOSIS — Z00.00 MEDICARE ANNUAL WELLNESS VISIT, SUBSEQUENT: ICD-10-CM

## 2024-05-02 DIAGNOSIS — K21.9 GASTROESOPHAGEAL REFLUX DISEASE WITHOUT ESOPHAGITIS: ICD-10-CM

## 2024-05-02 DIAGNOSIS — N28.9 RENAL INSUFFICIENCY: ICD-10-CM

## 2024-05-02 DIAGNOSIS — N39.0 URINARY TRACT INFECTION WITHOUT HEMATURIA, SITE UNSPECIFIED: ICD-10-CM

## 2024-05-02 DIAGNOSIS — Z78.9 NEVER SMOKED CIGARETTES: ICD-10-CM

## 2024-05-02 DIAGNOSIS — E78.49 OTHER HYPERLIPIDEMIA: ICD-10-CM

## 2024-05-02 PROCEDURE — 1170F FXNL STATUS ASSESSED: CPT | Performed by: FAMILY MEDICINE

## 2024-05-02 PROCEDURE — 1036F TOBACCO NON-USER: CPT | Performed by: FAMILY MEDICINE

## 2024-05-02 PROCEDURE — G0439 PPPS, SUBSEQ VISIT: HCPCS | Performed by: FAMILY MEDICINE

## 2024-05-02 PROCEDURE — 1160F RVW MEDS BY RX/DR IN RCRD: CPT | Performed by: FAMILY MEDICINE

## 2024-05-02 PROCEDURE — 1159F MED LIST DOCD IN RCRD: CPT | Performed by: FAMILY MEDICINE

## 2024-05-02 RX ORDER — CEPHALEXIN 500 MG/1
500 CAPSULE ORAL 2 TIMES DAILY
Qty: 14 CAPSULE | Refills: 0 | Status: SHIPPED | OUTPATIENT
Start: 2024-05-02 | End: 2024-05-09

## 2024-05-02 RX ORDER — OMEPRAZOLE 20 MG/1
40 CAPSULE, DELAYED RELEASE ORAL DAILY
Qty: 180 CAPSULE | Refills: 1 | Status: SHIPPED | OUTPATIENT
Start: 2024-05-02

## 2024-05-02 ASSESSMENT — ACTIVITIES OF DAILY LIVING (ADL)
BATHING: INDEPENDENT
DRESSING: INDEPENDENT
DOING_HOUSEWORK: INDEPENDENT
TAKING_MEDICATION: INDEPENDENT
GROCERY_SHOPPING: INDEPENDENT
MANAGING_FINANCES: INDEPENDENT

## 2024-05-02 ASSESSMENT — PATIENT HEALTH QUESTIONNAIRE - PHQ9
2. FEELING DOWN, DEPRESSED OR HOPELESS: NOT AT ALL
SUM OF ALL RESPONSES TO PHQ9 QUESTIONS 1 AND 2: 0
1. LITTLE INTEREST OR PLEASURE IN DOING THINGS: NOT AT ALL

## 2024-05-02 NOTE — PROGRESS NOTES
Efra Rogers is here for an annual wellness visit and follow-up on hypertension.  She states that she has been feeling well.  She does note some occasional urinary urgency and occasional increased frequency but this is more on an intermittent basis.  Her urinalysis did show greater than 50 WBC however.  She is has had no fevers or chills.  She continues on her medications as noted.  Eye exams are up-to-date    Patient ID: Sue Segura is a 89 y.o. female who presents for Medicare Annual Wellness Visit Subsequent and Med Refill (Omeprazole refill):    Problem List Items Addressed This Visit    None     Past Medical History:   Diagnosis Date    Acute cystitis without hematuria     Acute cystitis without hematuria    Body mass index (BMI) 19.9 or less, adult 06/21/2022    Body mass index (BMI) of 19.9 or less in adult    Body mass index (BMI) 20.0-20.9, adult 01/19/2022    Body mass index (BMI) of 20.0 to 20.9 in adult    Body mass index (BMI) 20.0-20.9, adult 11/02/2021    BMI 20.0-20.9, adult    Body mass index (BMI) 21.0-21.9, adult 11/16/2021    Body mass index (BMI) of 21.0 to 21.9 in adult    Body mass index (BMI) 22.0-22.9, adult 11/04/2021    Body mass index (BMI) of 22.0 to 22.9 in adult    Encounter for issue of repeat prescription 06/21/2022    Encounter for medication refill    Gastro-esophageal reflux disease with esophagitis, without bleeding     Reflux esophagitis    Impacted cerumen, bilateral     Bilateral impacted cerumen    Myalgia, other site     Left buttock pain    Pain in left hip     Hip pain, left    Person consulting for explanation of examination or test findings 11/04/2021    Encounter to discuss test results    Personal history of other diseases of the digestive system 10/11/2021    History of gastroesophageal reflux (GERD)    Personal history of other diseases of the respiratory system     History of bronchitis    Personal history of other diseases of the respiratory system      History of upper respiratory infection    Personal history of other drug therapy 01/19/2022    History of drug therapy    Personal history of other infectious and parasitic diseases 10/08/2021    History of onychomycosis    Personal history of other specified conditions 06/21/2022    History of chronic cough    Personal history of urinary (tract) infections     History of urinary tract infection    Underweight     Underweight    Unspecified disturbances of smell and taste     Smell disturbance    Unspecified otitis externa, unspecified ear     Otitis externa      Past Surgical History:   Procedure Laterality Date    OTHER SURGICAL HISTORY  10/08/2021    Arm surgery    OTHER SURGICAL HISTORY  10/08/2021    Lumpectomy    OTHER SURGICAL HISTORY  10/11/2021    Cardiac catheterization with stent placement    OTHER SURGICAL HISTORY  11/02/2021    Percutaneous transluminal coronary angioplasty    OTHER SURGICAL HISTORY  11/02/2021    Complete colonoscopy      Family History   Problem Relation Name Age of Onset    Other (cardiac disorder) Mother      Colon cancer Father        Social History     Socioeconomic History    Marital status:      Spouse name: Not on file    Number of children: Not on file    Years of education: Not on file    Highest education level: Not on file   Occupational History    Not on file   Tobacco Use    Smoking status: Never    Smokeless tobacco: Never   Substance and Sexual Activity    Alcohol use: Never    Drug use: Never    Sexual activity: Not on file   Other Topics Concern    Not on file   Social History Narrative    Not on file     Social Determinants of Health     Financial Resource Strain: Not on file   Food Insecurity: Not on file   Transportation Needs: Not on file   Physical Activity: Not on file   Stress: Not on file   Social Connections: Not on file   Intimate Partner Violence: Not on file   Housing Stability: Not on file      Glimepiride and Metformin   Current Outpatient  Medications   Medication Sig Dispense Refill    acetaminophen (Tylenol 8 HOUR) 650 mg ER tablet Take 1 tablet (650 mg) by mouth 4 times a day. As needed      lancets misc 2 times a day.  OneTouch Club Lancets Fine Pt  ONCE IN THE MORNING WHILE FASTING. ONCE 2 HOURS AFTER LUNCH OR DINNER      lisinopril 2.5 mg tablet Take 1 tablet by mouth once daily 1 tablet 0    multivitamin tablet Take by mouth once daily.      omeprazole (PriLOSEC) 20 mg DR capsule Take 2 capsules by mouth once daily 180 capsule 0    OneTouch Ultra Test strip 1 strip 2 times a day.      simvastatin (Zocor) 10 mg tablet Take 1 tablet by mouth once daily 90 tablet 1    albuterol 90 mcg/actuation inhaler Inhale 1 puff every 8 hours if needed.      cyanocobalamin (Vitamin B-12) 1,000 mcg tablet Take by mouth.      lisinopril 2.5 mg tablet Take 1 tablet by mouth once daily (Patient not taking: Reported on 5/2/2024) 90 tablet 1     No current facility-administered medications for this visit.       Immunization History   Administered Date(s) Administered    Influenza, Unspecified 11/16/2006, 10/18/2007, 10/16/2008, 11/03/2011, 09/13/2013, 10/23/2014, 10/30/2015, 10/23/2017, 10/12/2018, 10/02/2019, 10/09/2020    Influenza, seasonal, injectable 09/19/2016    Moderna SARS-CoV-2 Vaccination 03/09/2021, 04/06/2021    Pfizer Purple Cap SARS-CoV-2 01/05/2021, 01/26/2021    Pneumococcal conjugate vaccine, 13-valent (PREVNAR 13) 04/06/2017    Pneumococcal polysaccharide vaccine, 23-valent, age 2 years and older (PNEUMOVAX 23) 11/16/2018, 08/13/2019    Pneumococcal, Unspecified 05/31/2018    Tdap vaccine, age 7 year and older (BOOSTRIX, ADACEL) 08/08/2011    Zoster vaccine, recombinant, adult (SHINGRIX) 12/02/2020, 03/21/2021        Review of Systems   Constitutional: Negative.    HENT: Negative.     Eyes: Negative.    Respiratory: Negative.     Cardiovascular: Negative.    Gastrointestinal: Negative.    Endocrine: Negative.    Genitourinary:  Positive for  frequency and urgency.   Musculoskeletal: Negative.    Skin: Negative.    Allergic/Immunologic: Negative.    Neurological: Negative.    Hematological: Negative.    Psychiatric/Behavioral: Negative.     All other systems reviewed and are negative.       Vitals:    05/02/24 1638   BP: 146/73   Pulse: 84   Temp: 36.3 °C (97.3 °F)     Vitals:    05/02/24 1638   Weight: 53.3 kg (117 lb 6.4 oz)      Physical Exam  Constitutional:       General: She is not in acute distress.     Appearance: Normal appearance.   Neck:      Vascular: No carotid bruit.   Cardiovascular:      Rate and Rhythm: Normal rate and regular rhythm.      Pulses: Normal pulses.      Heart sounds: Normal heart sounds. No murmur heard.     No friction rub. No gallop.   Pulmonary:      Effort: Pulmonary effort is normal. No respiratory distress.      Breath sounds: Normal breath sounds. No wheezing or rales.   Musculoskeletal:      Cervical back: Neck supple.   Neurological:      General: No focal deficit present.      Mental Status: She is alert and oriented to person, place, and time. Mental status is at baseline.   Psychiatric:         Mood and Affect: Mood normal.         Thought Content: Thought content normal.          ASSESSMENT/PLAN: Annual wellness visit    Hypertension with slightly elevated reading.  Continue lisinopril as noted.    Hyperlipidemia stable with cholesterol 156 and LDL 85.  Continue simvastatin daily.    Multiple pulmonary nodules.  Follow-up CT of chest due in 6 months    Diabetes mellitus type 2 with fasting blood sugar 91.  Continue to monitor sugar and carbohydrates in diet.    Chronic kidney disease with slight worsening creatinine 1.4.  Recheck BMP in 1 month.  We discussed the importance of adequate fluid intake.    Intermittent urinary urgency and frequency with pyuria.  Begin cephalexin 500 mg twice daily for 7 days.    Continue current meds noted.  Follow-up in 6 months and call as needed         Scribe Attestation  By  signing my name below, I, Екатерина Olmos LPN, Scribe   attest that this documentation has been prepared under the direction and in the presence of Gilson Aguillon MD.

## 2024-05-05 ASSESSMENT — ENCOUNTER SYMPTOMS
CARDIOVASCULAR NEGATIVE: 1
EYES NEGATIVE: 1
GASTROINTESTINAL NEGATIVE: 1
ALLERGIC/IMMUNOLOGIC NEGATIVE: 1
RESPIRATORY NEGATIVE: 1
NEUROLOGICAL NEGATIVE: 1
MUSCULOSKELETAL NEGATIVE: 1
PSYCHIATRIC NEGATIVE: 1
ENDOCRINE NEGATIVE: 1
CONSTITUTIONAL NEGATIVE: 1
HEMATOLOGIC/LYMPHATIC NEGATIVE: 1
FREQUENCY: 1

## 2024-07-20 DIAGNOSIS — R03.0 ELEVATED BLOOD PRESSURE READING: ICD-10-CM

## 2024-07-22 RX ORDER — LISINOPRIL 2.5 MG/1
2.5 TABLET ORAL DAILY
Qty: 90 TABLET | Refills: 1 | Status: SHIPPED | OUTPATIENT
Start: 2024-07-22

## 2024-12-10 ENCOUNTER — LAB (OUTPATIENT)
Dept: LAB | Facility: LAB | Age: 89
End: 2024-12-10
Payer: MEDICARE

## 2024-12-10 DIAGNOSIS — N28.9 RENAL INSUFFICIENCY: ICD-10-CM

## 2024-12-10 LAB
ANION GAP SERPL CALC-SCNC: 14 MMOL/L (ref 10–20)
BUN SERPL-MCNC: 21 MG/DL (ref 6–23)
CALCIUM SERPL-MCNC: 9.6 MG/DL (ref 8.6–10.6)
CHLORIDE SERPL-SCNC: 102 MMOL/L (ref 98–107)
CO2 SERPL-SCNC: 28 MMOL/L (ref 21–32)
CREAT SERPL-MCNC: 1.44 MG/DL (ref 0.5–1.05)
EGFRCR SERPLBLD CKD-EPI 2021: 34 ML/MIN/1.73M*2
GLUCOSE SERPL-MCNC: 106 MG/DL (ref 74–99)
POTASSIUM SERPL-SCNC: 4.2 MMOL/L (ref 3.5–5.3)
SODIUM SERPL-SCNC: 140 MMOL/L (ref 136–145)

## 2024-12-10 PROCEDURE — 36415 COLL VENOUS BLD VENIPUNCTURE: CPT

## 2024-12-10 PROCEDURE — 80048 BASIC METABOLIC PNL TOTAL CA: CPT

## 2025-01-27 ENCOUNTER — APPOINTMENT (OUTPATIENT)
Dept: PRIMARY CARE | Facility: CLINIC | Age: OVER 89
End: 2025-01-27
Payer: MEDICARE

## 2025-02-17 ENCOUNTER — APPOINTMENT (OUTPATIENT)
Dept: PRIMARY CARE | Facility: CLINIC | Age: OVER 89
End: 2025-02-17
Payer: MEDICARE

## 2025-04-17 ENCOUNTER — APPOINTMENT (OUTPATIENT)
Dept: PRIMARY CARE | Facility: CLINIC | Age: OVER 89
End: 2025-04-17
Payer: MEDICARE

## 2025-04-26 DIAGNOSIS — K21.9 GASTROESOPHAGEAL REFLUX DISEASE WITHOUT ESOPHAGITIS: ICD-10-CM

## 2025-04-28 RX ORDER — OMEPRAZOLE 20 MG/1
40 CAPSULE, DELAYED RELEASE ORAL DAILY
Qty: 180 CAPSULE | Refills: 1 | Status: SHIPPED | OUTPATIENT
Start: 2025-04-28

## 2025-06-26 ENCOUNTER — APPOINTMENT (OUTPATIENT)
Dept: PRIMARY CARE | Facility: CLINIC | Age: OVER 89
End: 2025-06-26
Payer: MEDICARE

## 2025-06-26 VITALS
BODY MASS INDEX: 21.53 KG/M2 | DIASTOLIC BLOOD PRESSURE: 74 MMHG | WEIGHT: 117 LBS | HEART RATE: 90 BPM | TEMPERATURE: 98.6 F | HEIGHT: 62 IN | SYSTOLIC BLOOD PRESSURE: 130 MMHG

## 2025-06-26 DIAGNOSIS — E78.49 OTHER HYPERLIPIDEMIA: ICD-10-CM

## 2025-06-26 DIAGNOSIS — N18.32 CHRONIC KIDNEY DISEASE, STAGE 3B (MULTI): ICD-10-CM

## 2025-06-26 DIAGNOSIS — Z78.9 NEVER SMOKED CIGARETTES: ICD-10-CM

## 2025-06-26 DIAGNOSIS — K21.9 GASTROESOPHAGEAL REFLUX DISEASE WITHOUT ESOPHAGITIS: ICD-10-CM

## 2025-06-26 DIAGNOSIS — E11.9 TYPE 2 DIABETES MELLITUS WITHOUT COMPLICATION, WITHOUT LONG-TERM CURRENT USE OF INSULIN: Primary | ICD-10-CM

## 2025-06-26 DIAGNOSIS — R79.89 ABNORMAL TSH: ICD-10-CM

## 2025-06-26 DIAGNOSIS — N28.9 RENAL INSUFFICIENCY: ICD-10-CM

## 2025-06-26 PROCEDURE — 99213 OFFICE O/P EST LOW 20 MIN: CPT | Performed by: FAMILY MEDICINE

## 2025-06-26 PROCEDURE — 1036F TOBACCO NON-USER: CPT | Performed by: FAMILY MEDICINE

## 2025-06-26 PROCEDURE — 1160F RVW MEDS BY RX/DR IN RCRD: CPT | Performed by: FAMILY MEDICINE

## 2025-06-26 PROCEDURE — 1123F ACP DISCUSS/DSCN MKR DOCD: CPT | Performed by: FAMILY MEDICINE

## 2025-06-26 PROCEDURE — 1159F MED LIST DOCD IN RCRD: CPT | Performed by: FAMILY MEDICINE

## 2025-06-26 PROCEDURE — 1158F ADVNC CARE PLAN TLK DOCD: CPT | Performed by: FAMILY MEDICINE

## 2025-06-26 RX ORDER — LANOLIN ALCOHOL/MO/W.PET/CERES
CREAM (GRAM) TOPICAL DAILY
COMMUNITY

## 2025-06-26 RX ORDER — OMEPRAZOLE 20 MG/1
20 CAPSULE, DELAYED RELEASE ORAL 2 TIMES DAILY
Qty: 180 CAPSULE | Refills: 3 | Status: SHIPPED | OUTPATIENT
Start: 2025-06-26

## 2025-06-26 ASSESSMENT — ENCOUNTER SYMPTOMS
EYES NEGATIVE: 1
CARDIOVASCULAR NEGATIVE: 1
ENDOCRINE NEGATIVE: 1
RESPIRATORY NEGATIVE: 1
CONSTITUTIONAL NEGATIVE: 1
MUSCULOSKELETAL NEGATIVE: 1
NEUROLOGICAL NEGATIVE: 1
HEMATOLOGIC/LYMPHATIC NEGATIVE: 1
PSYCHIATRIC NEGATIVE: 1
ALLERGIC/IMMUNOLOGIC NEGATIVE: 1
GASTROINTESTINAL NEGATIVE: 1

## 2025-06-26 ASSESSMENT — PATIENT HEALTH QUESTIONNAIRE - PHQ9
1. LITTLE INTEREST OR PLEASURE IN DOING THINGS: NOT AT ALL
2. FEELING DOWN, DEPRESSED OR HOPELESS: NOT AT ALL
SUM OF ALL RESPONSES TO PHQ9 QUESTIONS 1 AND 2: 0

## 2025-06-26 NOTE — PROGRESS NOTES
Subjective     Patient ID: Sue Segura is a 89 y.o. female who presents for Follow-up (6m follow up):    Problem List Items Addressed This Visit    None     Medical History[1]   Surgical History[2]   Family History[3]   Social History     Socioeconomic History    Marital status:      Spouse name: Not on file    Number of children: Not on file    Years of education: Not on file    Highest education level: Not on file   Occupational History    Not on file   Tobacco Use    Smoking status: Never    Smokeless tobacco: Never   Substance and Sexual Activity    Alcohol use: Never    Drug use: Never    Sexual activity: Not on file   Other Topics Concern    Not on file   Social History Narrative    Not on file     Social Drivers of Health     Financial Resource Strain: Not on file   Food Insecurity: Not on file   Transportation Needs: Not on file   Physical Activity: Not on file   Stress: Not on file   Social Connections: Not on file   Intimate Partner Violence: Not on file   Housing Stability: Not on file      Glimepiride and Metformin   Current Medications[4]    Immunization History   Administered Date(s) Administered    Influenza, Unspecified 11/16/2006, 10/18/2007, 10/16/2008, 11/03/2011, 09/13/2013, 10/23/2014, 10/30/2015, 10/23/2017, 10/12/2018, 10/02/2019, 10/09/2020    Influenza, seasonal, injectable 09/19/2016    Moderna SARS-CoV-2 Vaccination 03/09/2021, 04/06/2021    Pfizer Purple Cap SARS-CoV-2 01/05/2021, 01/26/2021    Pneumococcal conjugate vaccine, 13-valent (PREVNAR 13) 04/06/2017    Pneumococcal polysaccharide vaccine, 23-valent, age 2 years and older (PNEUMOVAX 23) 11/16/2018, 08/13/2019    Pneumococcal, Unspecified 05/31/2018    Tdap vaccine, age 7 year and older (BOOSTRIX, ADACEL) 08/08/2011    Zoster vaccine, recombinant, adult (SHINGRIX) 12/02/2020, 03/21/2021        Review of Systems     Vitals:    06/26/25 1415   BP: 130/74   Pulse: 90   Temp: 37 °C (98.6 °F)     Vitals:    06/26/25 1415    Weight: 53.1 kg (117 lb)      Physical Exam     ASSESSMENT/PLAN:       Scribe Attestation  By signing my name below, I, Екатерина Olmos LPN, [place]   attest that this documentation has been prepared under the direction and in the presence of Gilson Aguillon MD.         [1]   Past Medical History:  Diagnosis Date    Acute cystitis without hematuria     Acute cystitis without hematuria    Body mass index (BMI) 19.9 or less, adult 06/21/2022    Body mass index (BMI) of 19.9 or less in adult    Body mass index (BMI) 20.0-20.9, adult 01/19/2022    Body mass index (BMI) of 20.0 to 20.9 in adult    Body mass index (BMI) 20.0-20.9, adult 11/02/2021    BMI 20.0-20.9, adult    Body mass index (BMI) 21.0-21.9, adult 11/16/2021    Body mass index (BMI) of 21.0 to 21.9 in adult    Body mass index (BMI) 22.0-22.9, adult 11/04/2021    Body mass index (BMI) of 22.0 to 22.9 in adult    Encounter for issue of repeat prescription 06/21/2022    Encounter for medication refill    Gastro-esophageal reflux disease with esophagitis, without bleeding     Reflux esophagitis    Impacted cerumen, bilateral     Bilateral impacted cerumen    Myalgia, other site     Left buttock pain    Pain in left hip     Hip pain, left    Person consulting for explanation of examination or test findings 11/04/2021    Encounter to discuss test results    Personal history of other diseases of the digestive system 10/11/2021    History of gastroesophageal reflux (GERD)    Personal history of other diseases of the respiratory system     History of bronchitis    Personal history of other diseases of the respiratory system     History of upper respiratory infection    Personal history of other drug therapy 01/19/2022    History of drug therapy    Personal history of other infectious and parasitic diseases 10/08/2021    History of onychomycosis    Personal history of other specified conditions 06/21/2022    History of chronic cough    Personal history of  urinary (tract) infections     History of urinary tract infection    Underweight     Underweight    Unspecified disturbances of smell and taste     Smell disturbance    Unspecified otitis externa, unspecified ear     Otitis externa   [2]   Past Surgical History:  Procedure Laterality Date    OTHER SURGICAL HISTORY  10/08/2021    Arm surgery    OTHER SURGICAL HISTORY  10/08/2021    Lumpectomy    OTHER SURGICAL HISTORY  10/11/2021    Cardiac catheterization with stent placement    OTHER SURGICAL HISTORY  11/02/2021    Percutaneous transluminal coronary angioplasty    OTHER SURGICAL HISTORY  11/02/2021    Complete colonoscopy   [3]   Family History  Problem Relation Name Age of Onset    Other (cardiac disorder) Mother      Colon cancer Father     [4]   Current Outpatient Medications   Medication Sig Dispense Refill    acetaminophen (Tylenol 8 HOUR) 650 mg ER tablet Take 1 tablet (650 mg) by mouth 4 times a day. As needed (Patient taking differently: Take 1 tablet (650 mg) by mouth if needed. As needed)      cyanocobalamin (Vitamin B-12) 1,000 mcg tablet Take by mouth once daily.      lancets misc 2 times a day.  OneTouch Club Lancets Fine Pt  ONCE IN THE MORNING WHILE FASTING. ONCE 2 HOURS AFTER LUNCH OR DINNER      multivitamin tablet Take by mouth once daily.      omeprazole (PriLOSEC) 20 mg DR capsule Take 2 capsules by mouth once daily (Patient taking differently: Take 1 capsule (20 mg) by mouth 2 times a day.) 180 capsule 1    OneTouch Ultra Test strip 1 strip 2 times a day.      ZINC ORAL Take by mouth once daily.      lisinopril 2.5 mg tablet Take 1 tablet by mouth once daily (Patient not taking: Reported on 6/26/2025) 90 tablet 1    simvastatin (Zocor) 10 mg tablet Take 1 tablet by mouth once daily (Patient not taking: Reported on 6/26/2025) 90 tablet 1     No current facility-administered medications for this visit.

## 2025-06-26 NOTE — PROGRESS NOTES
Efra Ghotra is here for a follow-up on her hypertension.  She states that she is feeling well and has no complaints.  She discontinued her lisinopril and simvastatin several months ago.  She felt like the simvastatin was giving her muscle pains and she does feel better without it.  She continues only at this point on vitamins and omeprazole daily.  Her eye exams are up-to-date.    Patient ID: Sue Segura is a 89 y.o. female who presents for Follow-up (6m follow up):    Problem List Items Addressed This Visit       Abnormal TSH    Relevant Orders    CBC    Comprehensive Metabolic Panel    Lipid Panel    TSH with reflex to Free T4 if abnormal    Gastroesophageal reflux disease    Relevant Medications    omeprazole (PriLOSEC) 20 mg DR capsule    Hyperlipidemia    Relevant Orders    CBC    Comprehensive Metabolic Panel    Lipid Panel    TSH with reflex to Free T4 if abnormal    BMI 21.0-21.9, adult    Relevant Orders    CBC    Comprehensive Metabolic Panel    Lipid Panel    TSH with reflex to Free T4 if abnormal    Never smoked cigarettes    Renal insufficiency    Relevant Orders    CBC    Comprehensive Metabolic Panel    Lipid Panel    TSH with reflex to Free T4 if abnormal      Medical History[1]   Surgical History[2]   Family History[3]   Social History     Socioeconomic History    Marital status:      Spouse name: Not on file    Number of children: Not on file    Years of education: Not on file    Highest education level: Not on file   Occupational History    Not on file   Tobacco Use    Smoking status: Never    Smokeless tobacco: Never   Substance and Sexual Activity    Alcohol use: Never    Drug use: Never    Sexual activity: Not on file   Other Topics Concern    Not on file   Social History Narrative    Not on file     Social Drivers of Health     Financial Resource Strain: Not on file   Food Insecurity: Not on file   Transportation Needs: Not on file   Physical Activity: Not on file   Stress:  Not on file   Social Connections: Not on file   Intimate Partner Violence: Not on file   Housing Stability: Not on file      Glimepiride and Metformin   Current Medications[4]    Immunization History   Administered Date(s) Administered    Influenza, Unspecified 11/16/2006, 10/18/2007, 10/16/2008, 11/03/2011, 09/13/2013, 10/23/2014, 10/30/2015, 10/23/2017, 10/12/2018, 10/02/2019, 10/09/2020    Influenza, seasonal, injectable 09/19/2016    Moderna SARS-CoV-2 Vaccination 03/09/2021, 04/06/2021    Pfizer Purple Cap SARS-CoV-2 01/05/2021, 01/26/2021    Pneumococcal conjugate vaccine, 13-valent (PREVNAR 13) 04/06/2017    Pneumococcal polysaccharide vaccine, 23-valent, age 2 years and older (PNEUMOVAX 23) 11/16/2018, 08/13/2019    Pneumococcal, Unspecified 05/31/2018    Tdap vaccine, age 7 year and older (BOOSTRIX, ADACEL) 08/08/2011    Zoster vaccine, recombinant, adult (SHINGRIX) 12/02/2020, 03/21/2021        Review of Systems   Constitutional: Negative.    HENT: Negative.     Eyes: Negative.    Respiratory: Negative.     Cardiovascular: Negative.    Gastrointestinal: Negative.    Endocrine: Negative.    Genitourinary: Negative.    Musculoskeletal: Negative.    Skin: Negative.    Allergic/Immunologic: Negative.    Neurological: Negative.    Hematological: Negative.    Psychiatric/Behavioral: Negative.     All other systems reviewed and are negative.       Vitals:    06/26/25 1415   BP: 130/74   Pulse: 90   Temp: 37 °C (98.6 °F)     Vitals:    06/26/25 1415   Weight: 53.1 kg (117 lb)      Physical Exam  Constitutional:       General: She is not in acute distress.     Appearance: Normal appearance.   Neck:      Vascular: No carotid bruit.   Cardiovascular:      Rate and Rhythm: Normal rate and regular rhythm.      Pulses: Normal pulses.      Heart sounds: Murmur (1/6 systolic ejection murmur at the left sternal border with radiation to the left carotid) heard.      No friction rub. No gallop.   Pulmonary:      Effort:  Pulmonary effort is normal. No respiratory distress.      Breath sounds: Normal breath sounds. No wheezing or rales.   Musculoskeletal:      Cervical back: Neck supple.   Neurological:      General: No focal deficit present.      Mental Status: She is alert and oriented to person, place, and time. Mental status is at baseline.   Psychiatric:         Mood and Affect: Mood normal.         Thought Content: Thought content normal.         Judgment: Judgment normal.          ASSESSMENT/PLAN: Hypertension stable.  Continue to monitor salt in diet and stay active.    Hyperlipidemia.  Check lipid profile and CMP    Diabetes mellitus type 2.  Check CMP and A1c.    Chronic kidney disease.  Continue to monitor with above labs.    Patient defers further immunizations and mammograms  Follow-up in 6 months and call as needed              [1]   Past Medical History:  Diagnosis Date    Acute cystitis without hematuria     Acute cystitis without hematuria    Body mass index (BMI) 19.9 or less, adult 06/21/2022    Body mass index (BMI) of 19.9 or less in adult    Body mass index (BMI) 20.0-20.9, adult 01/19/2022    Body mass index (BMI) of 20.0 to 20.9 in adult    Body mass index (BMI) 20.0-20.9, adult 11/02/2021    BMI 20.0-20.9, adult    Body mass index (BMI) 21.0-21.9, adult 11/16/2021    Body mass index (BMI) of 21.0 to 21.9 in adult    Body mass index (BMI) 22.0-22.9, adult 11/04/2021    Body mass index (BMI) of 22.0 to 22.9 in adult    Encounter for issue of repeat prescription 06/21/2022    Encounter for medication refill    Gastro-esophageal reflux disease with esophagitis, without bleeding     Reflux esophagitis    Impacted cerumen, bilateral     Bilateral impacted cerumen    Myalgia, other site     Left buttock pain    Pain in left hip     Hip pain, left    Person consulting for explanation of examination or test findings 11/04/2021    Encounter to discuss test results    Personal history of other diseases of the digestive  system 10/11/2021    History of gastroesophageal reflux (GERD)    Personal history of other diseases of the respiratory system     History of bronchitis    Personal history of other diseases of the respiratory system     History of upper respiratory infection    Personal history of other drug therapy 01/19/2022    History of drug therapy    Personal history of other infectious and parasitic diseases 10/08/2021    History of onychomycosis    Personal history of other specified conditions 06/21/2022    History of chronic cough    Personal history of urinary (tract) infections     History of urinary tract infection    Underweight     Underweight    Unspecified disturbances of smell and taste     Smell disturbance    Unspecified otitis externa, unspecified ear     Otitis externa   [2]   Past Surgical History:  Procedure Laterality Date    OTHER SURGICAL HISTORY  10/08/2021    Arm surgery    OTHER SURGICAL HISTORY  10/08/2021    Lumpectomy    OTHER SURGICAL HISTORY  10/11/2021    Cardiac catheterization with stent placement    OTHER SURGICAL HISTORY  11/02/2021    Percutaneous transluminal coronary angioplasty    OTHER SURGICAL HISTORY  11/02/2021    Complete colonoscopy   [3]   Family History  Problem Relation Name Age of Onset    Other (cardiac disorder) Mother      Colon cancer Father     [4]   Current Outpatient Medications   Medication Sig Dispense Refill    acetaminophen (Tylenol 8 HOUR) 650 mg ER tablet Take 1 tablet (650 mg) by mouth if needed for moderate pain (4 - 6), mild pain (1 - 3) or headaches. As needed      cyanocobalamin (Vitamin B-12) 1,000 mcg tablet Take by mouth once daily.      lancets misc 2 times a day.  OneTouch Club Lancets Fine Pt  ONCE IN THE MORNING WHILE FASTING. ONCE 2 HOURS AFTER LUNCH OR DINNER      multivitamin tablet Take by mouth once daily.      OneTouch Ultra Test strip 1 strip 2 times a day.      ZINC ORAL Take by mouth once daily.      omeprazole (PriLOSEC) 20 mg DR capsule Take 1  capsule (20 mg) by mouth 2 times a day. 180 capsule 3     No current facility-administered medications for this visit.

## 2025-07-30 ENCOUNTER — APPOINTMENT (OUTPATIENT)
Dept: RADIOLOGY | Facility: HOSPITAL | Age: OVER 89
End: 2025-07-30
Payer: MEDICARE

## 2025-07-30 ENCOUNTER — HOSPITAL ENCOUNTER (EMERGENCY)
Facility: HOSPITAL | Age: OVER 89
Discharge: HOME | End: 2025-07-30
Attending: STUDENT IN AN ORGANIZED HEALTH CARE EDUCATION/TRAINING PROGRAM
Payer: MEDICARE

## 2025-07-30 VITALS
HEIGHT: 64 IN | HEART RATE: 66 BPM | BODY MASS INDEX: 19.12 KG/M2 | RESPIRATION RATE: 16 BRPM | TEMPERATURE: 98.6 F | WEIGHT: 112 LBS | SYSTOLIC BLOOD PRESSURE: 179 MMHG | DIASTOLIC BLOOD PRESSURE: 86 MMHG | OXYGEN SATURATION: 97 %

## 2025-07-30 DIAGNOSIS — M25.551 PAIN OF RIGHT HIP: ICD-10-CM

## 2025-07-30 DIAGNOSIS — S39.012A LUMBAR STRAIN, INITIAL ENCOUNTER: Primary | ICD-10-CM

## 2025-07-30 PROCEDURE — 73502 X-RAY EXAM HIP UNI 2-3 VIEWS: CPT | Mod: RT

## 2025-07-30 PROCEDURE — 2500000001 HC RX 250 WO HCPCS SELF ADMINISTERED DRUGS (ALT 637 FOR MEDICARE OP)

## 2025-07-30 PROCEDURE — 72100 X-RAY EXAM L-S SPINE 2/3 VWS: CPT

## 2025-07-30 PROCEDURE — 73502 X-RAY EXAM HIP UNI 2-3 VIEWS: CPT | Mod: RIGHT SIDE | Performed by: RADIOLOGY

## 2025-07-30 PROCEDURE — 72100 X-RAY EXAM L-S SPINE 2/3 VWS: CPT | Performed by: RADIOLOGY

## 2025-07-30 PROCEDURE — 99284 EMERGENCY DEPT VISIT MOD MDM: CPT | Performed by: STUDENT IN AN ORGANIZED HEALTH CARE EDUCATION/TRAINING PROGRAM

## 2025-07-30 RX ORDER — ACETAMINOPHEN 325 MG/1
650 TABLET ORAL ONCE
Status: COMPLETED | OUTPATIENT
Start: 2025-07-30 | End: 2025-07-30

## 2025-07-30 RX ADMIN — ACETAMINOPHEN 650 MG: 325 TABLET ORAL at 16:12

## 2025-07-30 ASSESSMENT — PAIN - FUNCTIONAL ASSESSMENT
PAIN_FUNCTIONAL_ASSESSMENT: 0-10

## 2025-07-30 ASSESSMENT — LIFESTYLE VARIABLES
EVER HAD A DRINK FIRST THING IN THE MORNING TO STEADY YOUR NERVES TO GET RID OF A HANGOVER: NO
TOTAL SCORE: 0
HAVE YOU EVER FELT YOU SHOULD CUT DOWN ON YOUR DRINKING: NO
EVER FELT BAD OR GUILTY ABOUT YOUR DRINKING: NO
HAVE PEOPLE ANNOYED YOU BY CRITICIZING YOUR DRINKING: NO

## 2025-07-30 ASSESSMENT — PAIN SCALES - GENERAL
PAINLEVEL_OUTOF10: 3
PAINLEVEL_OUTOF10: 0 - NO PAIN
PAINLEVEL_OUTOF10: 4

## 2025-07-30 ASSESSMENT — PAIN DESCRIPTION - PAIN TYPE: TYPE: ACUTE PAIN

## 2025-07-30 ASSESSMENT — PAIN DESCRIPTION - LOCATION: LOCATION: HIP

## 2025-07-30 NOTE — ED TRIAGE NOTES
"Patient reports \"twisting\" her body this AM and injuring her right hip. Patient is able to walk but states her leg feels like its going to give out.   "

## 2025-07-30 NOTE — DISCHARGE INSTRUCTIONS
You were evaluated in the emergency department today for back pain. Your evaluation did not show signs of medical conditions requiring emergent intervention at this time.     It is very important that you follow-up with your primary care provider for ongoing management of your pain and symptoms. We have also provided you with a referral for physical therapy. Avoid any heavy lifting and you may continue to use tylenol for pain management.     Please do not hesitate to return to the emergency department or seek immediate medical attention if you begin to have any of the following symptoms: numbness in your groin, numbness and weakness in your lower extremities, inability to control your lower extremities, loss of control of your bladder or bowels, abdominal pain, chest pain, shortness of breath, or any new or concerning symptoms.

## 2025-08-04 ENCOUNTER — APPOINTMENT (OUTPATIENT)
Dept: PRIMARY CARE | Facility: CLINIC | Age: OVER 89
End: 2025-08-04
Payer: MEDICARE

## 2025-08-04 ENCOUNTER — OFFICE VISIT (OUTPATIENT)
Dept: PRIMARY CARE | Facility: CLINIC | Age: OVER 89
End: 2025-08-04
Payer: MEDICARE

## 2025-08-04 VITALS
BODY MASS INDEX: 20.14 KG/M2 | HEART RATE: 88 BPM | SYSTOLIC BLOOD PRESSURE: 144 MMHG | WEIGHT: 118 LBS | TEMPERATURE: 96.7 F | HEIGHT: 64 IN | DIASTOLIC BLOOD PRESSURE: 80 MMHG
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
## 2025-08-04 DIAGNOSIS — S79.911A INJURY OF RIGHT HIP, INITIAL ENCOUNTER: ICD-10-CM

## 2025-08-04 DIAGNOSIS — Z78.9 NEVER SMOKED CIGARETTES: ICD-10-CM

## 2025-08-04 DIAGNOSIS — M25.551 RIGHT HIP PAIN: ICD-10-CM

## 2025-08-04 PROCEDURE — 1036F TOBACCO NON-USER: CPT | Performed by: FAMILY MEDICINE

## 2025-08-04 PROCEDURE — 1160F RVW MEDS BY RX/DR IN RCRD: CPT | Performed by: FAMILY MEDICINE

## 2025-08-04 PROCEDURE — 99213 OFFICE O/P EST LOW 20 MIN: CPT | Performed by: FAMILY MEDICINE

## 2025-08-04 PROCEDURE — 1159F MED LIST DOCD IN RCRD: CPT | Performed by: FAMILY MEDICINE

## 2025-08-04 RX ORDER — ASPIRIN 81 MG/1
81 TABLET ORAL DAILY
COMMUNITY

## 2025-08-04 ASSESSMENT — ENCOUNTER SYMPTOMS
ARTHRALGIAS: 1
BACK PAIN: 1

## 2025-08-04 NOTE — PROGRESS NOTES
Efra Ghotra is here for post ER follow-up for right hip and low back injury on July 30, 2025.  Her  had slid down off the bed onto the floor and she was attempting to help him up.  She was bending over and felt a sudden onset of pain in her right lower back and right hip region.  She did not fall.  There was no head injury she went to 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  She has been using Tylenol and lidocaine patches since then.  She says the pain is improving and has not been severe.  There has been no worsening.  She feels some pain in her right calf but no numbness or tingling in her legs.  She continues on her other meds noted.  She has no other complaints at the present time    Patient ID: Sue Segura is a 89 y.o. female who presents for Follow-up (ED 07/30 [facility], right hip pain):    Problem List Items Addressed This Visit    None     Medical History[1]   Surgical History[2]   Family History[3]   Social History     Socioeconomic History    Marital status:      Spouse name: Not on file    Number of children: Not on file    Years of education: Not on file    Highest education level: Not on file   Occupational History    Not on file   Tobacco Use    Smoking status: Never    Smokeless tobacco: Never   Substance and Sexual Activity    Alcohol use: Never    Drug use: Never    Sexual activity: Defer   Other Topics Concern    Not on file   Social History Narrative    Not on file     Social Drivers of Health     Financial Resource Strain: Not on file   Food Insecurity: Not on file   Transportation Needs: Not on file   Physical Activity: Not on file   Stress: Not on file   Social Connections: Not on file   Intimate Partner Violence: Not on file   Housing Stability: Not on file      Glimepiride and Metformin   Current Medications[4]    Immunization History   Administered Date(s) Administered    Influenza,  Unspecified 11/16/2006, 10/18/2007, 10/16/2008, 11/03/2011, 09/13/2013, 10/23/2014, 10/30/2015, 10/23/2017, 10/12/2018, 10/02/2019, 10/09/2020    Influenza, seasonal, injectable 09/19/2016    Moderna SARS-CoV-2 Vaccination 03/09/2021, 04/06/2021    Pfizer Purple Cap SARS-CoV-2 01/05/2021, 01/26/2021    Pneumococcal conjugate vaccine, 13-valent (PREVNAR 13) 04/06/2017    Pneumococcal polysaccharide vaccine, 23-valent, age 2 years and older (PNEUMOVAX 23) 11/16/2018, 08/13/2019    Pneumococcal, Unspecified 05/31/2018    Tdap vaccine, age 7 year and older (BOOSTRIX, ADACEL) 08/08/2011    Zoster vaccine, recombinant, adult (SHINGRIX) 12/02/2020, 03/21/2021        Review of Systems   Musculoskeletal:  Positive for arthralgias and back pain.   All other systems reviewed and are negative.       Vitals:    08/04/25 1425   BP: 144/80   Pulse: 88   Temp: 35.9 °C (96.7 °F)     Vitals:    08/04/25 1425   Weight: 53.5 kg (118 lb)      Physical Exam  Constitutional:       General: She is not in acute distress.     Appearance: Normal appearance.     Cardiovascular:      Rate and Rhythm: Normal rate and regular rhythm.      Pulses: Normal pulses.      Heart sounds: Normal heart sounds. No murmur heard.     No friction rub. No gallop.   Pulmonary:      Effort: Pulmonary effort is normal. No respiratory distress.      Breath sounds: Normal breath sounds. No wheezing or rales.     Neurological:      Mental Status: She is alert.     Right hip-there is no tenderness over the trochanter.  There is tenderness posterior to the trochanter and in the right upper buttock region.  There is no deformity noted.  Her gait is normal.  Right calf and lower leg-there is no tenderness to palpation.  There is no swelling or redness in the lower leg.    ASSESSMENT/PLAN: Strain of lumbar area and right hip.  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  Continue Tylenol as needed and lidocaine patch as directed.  Rest as needed and avoid bending over or  heavy lifting.  Recommended that if her  falls again that she call for help rather than try to help him by herself.  Recommended orthopedic evaluation if any hip or back pain persists in the next 2 weeks    Hypertension with slightly elevated reading.  Continue to monitor.  She is currently not on any blood pressure medication.    History of hyperlipidemia diabetes and chronic kidney disease.  Labs are pending    Follow-up as scheduled and call as needed     Scribe Attestation  By signing my name below, I, Екатерина Olmos LPN, Scribe   attest that this documentation has been prepared under the direction and in the presence of Gilson Aguillon MD.         [1]   Past Medical History:  Diagnosis Date    Acute cystitis without hematuria     Acute cystitis without hematuria    Body mass index (BMI) 19.9 or less, adult 06/21/2022    Body mass index (BMI) of 19.9 or less in adult    Body mass index (BMI) 20.0-20.9, adult 01/19/2022    Body mass index (BMI) of 20.0 to 20.9 in adult    Body mass index (BMI) 20.0-20.9, adult 11/02/2021    BMI 20.0-20.9, adult    Body mass index (BMI) 21.0-21.9, adult 11/16/2021    Body mass index (BMI) of 21.0 to 21.9 in adult    Body mass index (BMI) 22.0-22.9, adult 11/04/2021    Body mass index (BMI) of 22.0 to 22.9 in adult    Encounter for issue of repeat prescription 06/21/2022    Encounter for medication refill    Gastro-esophageal reflux disease with esophagitis, without bleeding     Reflux esophagitis    Impacted cerumen, bilateral     Bilateral impacted cerumen    Myalgia, other site     Left buttock pain    Pain in left hip     Hip pain, left    Person consulting for explanation of examination or test findings 11/04/2021    Encounter to discuss test results    Personal history of other diseases of the digestive system 10/11/2021    History of gastroesophageal reflux (GERD)    Personal history of other diseases of the respiratory system     History of bronchitis     Personal history of other diseases of the respiratory system     History of upper respiratory infection    Personal history of other drug therapy 01/19/2022    History of drug therapy    Personal history of other infectious and parasitic diseases 10/08/2021    History of onychomycosis    Personal history of other specified conditions 06/21/2022    History of chronic cough    Personal history of urinary (tract) infections     History of urinary tract infection    Underweight     Underweight    Unspecified disturbances of smell and taste     Smell disturbance    Unspecified otitis externa, unspecified ear     Otitis externa   [2]   Past Surgical History:  Procedure Laterality Date    OTHER SURGICAL HISTORY  10/08/2021    Arm surgery    OTHER SURGICAL HISTORY  10/08/2021    Lumpectomy    OTHER SURGICAL HISTORY  10/11/2021    Cardiac catheterization with stent placement    OTHER SURGICAL HISTORY  11/02/2021    Percutaneous transluminal coronary angioplasty    OTHER SURGICAL HISTORY  11/02/2021    Complete colonoscopy   [3]   Family History  Problem Relation Name Age of Onset    Other (cardiac disorder) Mother      Colon cancer Father     [4]   Current Outpatient Medications   Medication Sig Dispense Refill    acetaminophen (Tylenol 8 HOUR) 650 mg ER tablet Take 1 tablet (650 mg) by mouth if needed for moderate pain (4 - 6), mild pain (1 - 3) or headaches. As needed      aspirin 81 mg EC tablet Take 1 tablet (81 mg) by mouth once daily.      cyanocobalamin (Vitamin B-12) 1,000 mcg tablet Take by mouth once daily.      lancets misc 2 times a day.  OneTouch Club Lancets Fine Pt  ONCE IN THE MORNING WHILE FASTING. ONCE 2 HOURS AFTER LUNCH OR DINNER      multivitamin tablet Take by mouth once daily.      omeprazole (PriLOSEC) 20 mg DR capsule Take 1 capsule (20 mg) by mouth 2 times a day. 180 capsule 3    OneTouch Ultra Test strip 1 strip 2 times a day.      ZINC ORAL Take by mouth once daily.       No current  facility-administered medications for this visit.

## 2025-08-04 NOTE — PATIENT INSTRUCTIONS
Continue with OTC lidocaine patches  Continue with tylenol as needed as discussed  Follow up with ortho if not better in two weeks

## 2025-12-18 ENCOUNTER — APPOINTMENT (OUTPATIENT)
Dept: PRIMARY CARE | Facility: CLINIC | Age: OVER 89
End: 2025-12-18
Payer: MEDICARE